# Patient Record
Sex: FEMALE | Race: WHITE | Employment: FULL TIME | URBAN - METROPOLITAN AREA
[De-identification: names, ages, dates, MRNs, and addresses within clinical notes are randomized per-mention and may not be internally consistent; named-entity substitution may affect disease eponyms.]

---

## 2017-02-23 PROBLEM — E11.9 TYPE 2 DIABETES MELLITUS WITHOUT COMPLICATION, WITHOUT LONG-TERM CURRENT USE OF INSULIN (HCC): Status: ACTIVE | Noted: 2017-02-23

## 2017-02-23 PROBLEM — E78.2 MIXED HYPERLIPIDEMIA: Status: ACTIVE | Noted: 2017-02-23

## 2017-02-23 PROBLEM — E66.01 MORBID OBESITY DUE TO EXCESS CALORIES (HCC): Status: ACTIVE | Noted: 2017-02-23

## 2017-02-23 PROBLEM — Z79.4 TYPE 2 DIABETES MELLITUS WITHOUT COMPLICATION, WITH LONG-TERM CURRENT USE OF INSULIN (HCC): Status: ACTIVE | Noted: 2017-02-23

## 2017-10-27 PROBLEM — M65.4 DE QUERVAIN'S DISEASE (RADIAL STYLOID TENOSYNOVITIS): Status: ACTIVE | Noted: 2017-10-27

## 2017-11-29 ENCOUNTER — CLINICAL DOCUMENTATION (OUTPATIENT)
Dept: PHARMACY | Facility: CLINIC | Age: 57
End: 2017-11-29

## 2017-11-29 NOTE — PROGRESS NOTES
Pharmacy Pop Care Documentation:   Patient application received. Patient missing Lipid panel drawn yearly and MyChart account creation: code sent via email provided. Letter sent.

## 2017-11-29 NOTE — LETTER
Lex Pereira [address]           11/29/17     Dear Gisella Green,    Thanks so much for taking the first step towards better health. This letter is to inform you that we have received your enrollment form for the [facility] DM Program but you are missing the following requirements or documentation:    Lipid panel drawn yearly and MyChart account creation    To continue to qualify for this program the above requirements must be met by 12/15/17. Results or visits obtained outside of [facility] will need to be provided by fax or email to the numbers listed below before the deadline in order to qualify for the program.    If requirements are not met by the date listed above you will be disqualified from the program and the credit valued at $600 towards your diabetic medications and supplies will be revoked. You will be able to reapply the following calendar year. [facility] Team        [phone] Option #7    Email: [email or web address]    Fax Number: [phone]

## 2018-01-30 ENCOUNTER — TELEPHONE (OUTPATIENT)
Dept: PHARMACY | Facility: CLINIC | Age: 58
End: 2018-01-30

## 2018-01-30 NOTE — TELEPHONE ENCOUNTER
Called patient to schedule pharmacist appointment to discuss medications for Diabetes Management Program and for updated billing information for [address] [place] [address]. No answer. Left VM: Please call back at [phone] Option #7 to retrieve the above message.      Sreekanth [place], [address]   Department, toll free: [phone], option 7

## 2018-03-01 ENCOUNTER — TELEPHONE (OUTPATIENT)
Dept: PHARMACY | Facility: CLINIC | Age: 58
End: 2018-03-01

## 2018-03-01 NOTE — LETTER
Mark Anthony Ernandez Findlynda [address]           03/01/18     Dear Debra Harrison,    Thanks so much for taking the first step towards better health. To continue to qualify for this program one of the requirements that must be met by 6/01/18 is the following:    ? Meet with a [facility]) clinical pharmacist in person at a hospital location or by phone (This visit may be conducted prior to the start of the requirements period.)    This letter is to inform you that we have been attempting to contact you to schedule this appointment via telephone but we were unsuccessful. Please contact us at [phone] Option #7 to schedule this appointment. If requirements are not met by the date listed above you will be disqualified from the program and the credit valued at $600 towards your diabetic medications and supplies will be revoked. You will be able to reapply the following calendar year. [facility] Team    [phone] Option #7    Email: [email or web address]

## 2018-03-19 ENCOUNTER — TELEPHONE (OUTPATIENT)
Dept: NON INVASIVE DIAGNOSTICS | Age: 58
End: 2018-03-19

## 2018-03-19 ENCOUNTER — OFFICE VISIT (OUTPATIENT)
Dept: CARDIOLOGY CLINIC | Age: 58
End: 2018-03-19
Payer: COMMERCIAL

## 2018-03-19 VITALS
BODY MASS INDEX: 41.82 KG/M2 | HEIGHT: 65 IN | HEART RATE: 64 BPM | DIASTOLIC BLOOD PRESSURE: 78 MMHG | SYSTOLIC BLOOD PRESSURE: 130 MMHG | WEIGHT: 251 LBS

## 2018-03-19 DIAGNOSIS — E66.01 MORBID OBESITY DUE TO EXCESS CALORIES (HCC): ICD-10-CM

## 2018-03-19 DIAGNOSIS — I47.29 RVOT VENTRICULAR TACHYCARDIA: ICD-10-CM

## 2018-03-19 DIAGNOSIS — I47.1 PAROXYSMAL SUPRAVENTRICULAR TACHYCARDIA (HCC): Primary | ICD-10-CM

## 2018-03-19 DIAGNOSIS — I47.1 AVNRT (AV NODAL RE-ENTRY TACHYCARDIA) (HCC): ICD-10-CM

## 2018-03-19 DIAGNOSIS — E11.9 TYPE 2 DIABETES MELLITUS WITHOUT COMPLICATION, WITH LONG-TERM CURRENT USE OF INSULIN (HCC): ICD-10-CM

## 2018-03-19 DIAGNOSIS — Z79.4 TYPE 2 DIABETES MELLITUS WITHOUT COMPLICATION, WITH LONG-TERM CURRENT USE OF INSULIN (HCC): ICD-10-CM

## 2018-03-19 PROCEDURE — 99214 OFFICE O/P EST MOD 30 MIN: CPT | Performed by: INTERNAL MEDICINE

## 2018-03-19 PROCEDURE — 93000 ELECTROCARDIOGRAM COMPLETE: CPT | Performed by: INTERNAL MEDICINE

## 2018-03-19 RX ORDER — FLECAINIDE ACETATE 100 MG/1
100 TABLET ORAL 2 TIMES DAILY
Qty: 180 TABLET | Refills: 3 | Status: SHIPPED | OUTPATIENT
Start: 2018-03-19 | End: 2019-03-29 | Stop reason: SDUPTHER

## 2018-03-19 RX ORDER — ATENOLOL 25 MG/1
TABLET ORAL
Qty: 180 TABLET | Refills: 3 | Status: SHIPPED | OUTPATIENT
Start: 2018-03-19 | End: 2019-03-29 | Stop reason: SDUPTHER

## 2018-03-20 ENCOUNTER — PATIENT MESSAGE (OUTPATIENT)
Dept: PHARMACY | Facility: CLINIC | Age: 58
End: 2018-03-20

## 2018-03-26 ENCOUNTER — HOSPITAL ENCOUNTER (OUTPATIENT)
Age: 58
Discharge: HOME | End: 2018-03-28
Payer: COMMERCIAL

## 2018-03-26 PROCEDURE — 88175 CYTOPATH C/V AUTO FLUID REDO: CPT

## 2018-04-02 ENCOUNTER — TELEPHONE (OUTPATIENT)
Dept: NON INVASIVE DIAGNOSTICS | Age: 58
End: 2018-04-02

## 2018-04-06 ENCOUNTER — TELEPHONE (OUTPATIENT)
Dept: CARDIOLOGY CLINIC | Age: 58
End: 2018-04-06

## 2018-04-16 ENCOUNTER — TELEPHONE (OUTPATIENT)
Dept: PHARMACY | Facility: CLINIC | Age: 58
End: 2018-04-16

## 2018-06-06 ENCOUNTER — SCHEDULED TELEPHONE ENCOUNTER (OUTPATIENT)
Dept: PHARMACY | Facility: CLINIC | Age: 58
End: 2018-06-06

## 2018-06-06 RX ORDER — IBUPROFEN 200 MG
200 TABLET ORAL EVERY 6 HOURS PRN
COMMUNITY
End: 2019-08-22 | Stop reason: ALTCHOICE

## 2018-06-13 ENCOUNTER — HOSPITAL ENCOUNTER (OUTPATIENT)
Age: 58
Discharge: HOME | End: 2018-06-15
Payer: COMMERCIAL

## 2018-06-13 DIAGNOSIS — E11.42 TYPE 2 DIABETES MELLITUS WITH DIABETIC POLYNEUROPATHY, WITH LONG-TERM CURRENT USE OF INSULIN (HCC): ICD-10-CM

## 2018-06-13 DIAGNOSIS — Z79.4 TYPE 2 DIABETES MELLITUS WITH DIABETIC POLYNEUROPATHY, WITH LONG-TERM CURRENT USE OF INSULIN (HCC): ICD-10-CM

## 2018-06-13 DIAGNOSIS — Z11.4 SCREENING FOR HIV (HUMAN IMMUNODEFICIENCY VIRUS): ICD-10-CM

## 2018-06-13 LAB
ALBUMIN SERPL-MCNC: 4.4 G/DL (ref 3.5–5.2)
ALP BLD-CCNC: 69 U/L (ref 35–104)
ALT SERPL-CCNC: 21 U/L (ref 0–32)
ANION GAP SERPL CALCULATED.3IONS-SCNC: 20 MMOL/L (ref 7–16)
AST SERPL-CCNC: 18 U/L (ref 0–31)
BILIRUB SERPL-MCNC: 0.4 MG/DL (ref 0–1.2)
BUN BLDV-MCNC: 17 MG/DL (ref 6–20)
CALCIUM SERPL-MCNC: 9.8 MG/DL (ref 8.6–10.2)
CHLORIDE BLD-SCNC: 99 MMOL/L (ref 98–107)
CO2: 24 MMOL/L (ref 22–29)
CREAT SERPL-MCNC: 0.7 MG/DL (ref 0.5–1)
FOLATE: 12.3 NG/ML (ref 4.8–24.2)
GFR AFRICAN AMERICAN: >60
GFR NON-AFRICAN AMERICAN: >60 ML/MIN/1.73
GLUCOSE BLD-MCNC: 129 MG/DL (ref 74–109)
HBA1C MFR BLD: 6.8 % (ref 4.8–5.9)
POTASSIUM SERPL-SCNC: 4.3 MMOL/L (ref 3.5–5)
SODIUM BLD-SCNC: 143 MMOL/L (ref 132–146)
TOTAL PROTEIN: 7.7 G/DL (ref 6.4–8.3)
VITAMIN B-12: 277 PG/ML (ref 211–946)
VITAMIN D 25-HYDROXY: 11 NG/ML (ref 30–100)

## 2018-06-13 PROCEDURE — 83036 HEMOGLOBIN GLYCOSYLATED A1C: CPT

## 2018-06-13 PROCEDURE — 82306 VITAMIN D 25 HYDROXY: CPT

## 2018-06-13 PROCEDURE — 82607 VITAMIN B-12: CPT

## 2018-06-13 PROCEDURE — 82746 ASSAY OF FOLIC ACID SERUM: CPT

## 2018-06-13 PROCEDURE — 80053 COMPREHEN METABOLIC PANEL: CPT

## 2018-06-13 PROCEDURE — 86703 HIV-1/HIV-2 1 RESULT ANTBDY: CPT

## 2018-06-14 DIAGNOSIS — E55.9 VITAMIN D DEFICIENCY: Primary | ICD-10-CM

## 2018-06-14 LAB — HIV-1 AND HIV-2 ANTIBODIES: NORMAL

## 2018-06-14 RX ORDER — ERGOCALCIFEROL 1.25 MG/1
50000 CAPSULE ORAL WEEKLY
Qty: 12 CAPSULE | Refills: 2 | Status: SHIPPED | OUTPATIENT
Start: 2018-06-14 | End: 2019-03-04 | Stop reason: SDUPTHER

## 2018-06-24 ENCOUNTER — HOSPITAL ENCOUNTER (OUTPATIENT)
Age: 58
Discharge: HOME | End: 2018-06-24
Payer: COMMERCIAL

## 2018-06-24 LAB
BASOPHILS ABSOLUTE: 0.04 E9/L (ref 0–0.2)
BASOPHILS RELATIVE PERCENT: 0.6 % (ref 0–2)
EOSINOPHILS ABSOLUTE: 0.33 E9/L (ref 0.05–0.5)
EOSINOPHILS RELATIVE PERCENT: 4.7 % (ref 0–6)
HCT VFR BLD CALC: 39.2 % (ref 34–48)
HEMOGLOBIN: 12.6 G/DL (ref 11.5–15.5)
IMMATURE GRANULOCYTES #: 0.03 E9/L
IMMATURE GRANULOCYTES %: 0.4 % (ref 0–5)
LYMPHOCYTES ABSOLUTE: 1.68 E9/L (ref 1.5–4)
LYMPHOCYTES RELATIVE PERCENT: 23.9 % (ref 20–42)
MCH RBC QN AUTO: 27 PG (ref 26–35)
MCHC RBC AUTO-ENTMCNC: 32.1 % (ref 32–34.5)
MCV RBC AUTO: 83.9 FL (ref 80–99.9)
MONOCYTES ABSOLUTE: 0.41 E9/L (ref 0.1–0.95)
MONOCYTES RELATIVE PERCENT: 5.8 % (ref 2–12)
NEUTROPHILS ABSOLUTE: 4.54 E9/L (ref 1.8–7.3)
NEUTROPHILS RELATIVE PERCENT: 64.6 % (ref 43–80)
PDW BLD-RTO: 14.7 FL (ref 11.5–15)
PLATELET # BLD: 181 E9/L (ref 130–450)
PMV BLD AUTO: 10.2 FL (ref 7–12)
RBC # BLD: 4.67 E12/L (ref 3.5–5.5)
WBC # BLD: 7 E9/L (ref 4.5–11.5)

## 2018-06-24 PROCEDURE — 36415 COLL VENOUS BLD VENIPUNCTURE: CPT

## 2018-06-24 PROCEDURE — 85025 COMPLETE CBC W/AUTO DIFF WBC: CPT

## 2018-06-24 PROCEDURE — 86300 IMMUNOASSAY TUMOR CA 15-3: CPT

## 2018-06-26 LAB — CA 27.29: 20.8 U/ML (ref 0–40)

## 2018-08-31 ENCOUNTER — OFFICE VISIT (OUTPATIENT)
Dept: PHYSICAL MEDICINE AND REHAB | Age: 58
End: 2018-08-31
Payer: COMMERCIAL

## 2018-08-31 VITALS
OXYGEN SATURATION: 97 % | SYSTOLIC BLOOD PRESSURE: 104 MMHG | BODY MASS INDEX: 39.32 KG/M2 | DIASTOLIC BLOOD PRESSURE: 78 MMHG | HEIGHT: 65 IN | WEIGHT: 236 LBS | HEART RATE: 83 BPM

## 2018-08-31 DIAGNOSIS — M65.4 DE QUERVAIN'S DISEASE (RADIAL STYLOID TENOSYNOVITIS): ICD-10-CM

## 2018-08-31 DIAGNOSIS — M65.4 DE QUERVAIN'S DISEASE (RADIAL STYLOID TENOSYNOVITIS): Primary | ICD-10-CM

## 2018-08-31 DIAGNOSIS — M25.532 LEFT WRIST PAIN: ICD-10-CM

## 2018-08-31 PROCEDURE — 76942 ECHO GUIDE FOR BIOPSY: CPT | Performed by: PHYSICAL MEDICINE & REHABILITATION

## 2018-08-31 PROCEDURE — 20550 NJX 1 TENDON SHEATH/LIGAMENT: CPT | Performed by: PHYSICAL MEDICINE & REHABILITATION

## 2018-08-31 PROCEDURE — 99213 OFFICE O/P EST LOW 20 MIN: CPT | Performed by: PHYSICAL MEDICINE & REHABILITATION

## 2018-08-31 RX ORDER — TRIAMCINOLONE ACETONIDE 40 MG/ML
40 INJECTION, SUSPENSION INTRA-ARTICULAR; INTRAMUSCULAR ONCE
Status: COMPLETED | OUTPATIENT
Start: 2018-08-31 | End: 2018-08-31

## 2018-08-31 RX ORDER — BUPIVACAINE HYDROCHLORIDE 2.5 MG/ML
2 INJECTION, SOLUTION INFILTRATION; PERINEURAL ONCE
Status: COMPLETED | OUTPATIENT
Start: 2018-08-31 | End: 2018-08-31

## 2018-08-31 RX ADMIN — TRIAMCINOLONE ACETONIDE 40 MG: 40 INJECTION, SUSPENSION INTRA-ARTICULAR; INTRAMUSCULAR at 11:16

## 2018-08-31 RX ADMIN — BUPIVACAINE HYDROCHLORIDE 5 MG: 2.5 INJECTION, SOLUTION INFILTRATION; PERINEURAL at 11:15

## 2018-08-31 NOTE — PATIENT INSTRUCTIONS
We appreciate that you chose [facility] to provide your healthcare needs today. We took great pleasure in caring for you. You may receive a survey to help us learn how to make your next visit to a [facility] facility better than the last.   Your feedback is important to help us continually improve the service we can provide you. Please take the time to complete it thoughtfully if you receive one as we value the feedback in every survey. In this survey we would appreciate that you answer \"always\" or \"yes\" for as many questions as possible (this is the answer we get credit for doing a good job). If you feel there is a question you cannot answer \"always\" or \"yes\", please let us know before you leave today so we can remedy the situation right away. We look forward to continuing to provide you with excellent care. Considering the survey questions related to access to care, please keep in mind the urgency of your problem (i.e. was it an emergent or urgent visit or more routine)  and whether the urgency of that problem was handled appropriately by our office. We always do our best to prioritize the patients who need the care most urgently given our specialty is in short supply and there is a high demand for visits. Thank you for your time and consideration.

## 2018-08-31 NOTE — PROGRESS NOTES
Don Gage D.O. [facility]  [address] [facility]  Phone: [phone]  Fax: [phone]      8/31/18    Chief Complaint   Patient presents with    Hand Pain     Follow Up Left Hand Pain     Wrist Pain       HPI:  Riley Saldivar is a 60y.o. year old woman seen today in follow up regarding wrist pain. Interval history: Since the last visit the patient had 100% relief of pain since the first dorsal compartment injection in October 2017 until about 3 weeks ago when the pain flared back up. Today, the pain is rated Pain Score:   7 where 0 is no pain and 10 is pain as bad as it can be. The pain is located in the left wrist,  does not radiate and is described as aching. This pain occurs intermittently. The symptoms have been better since onset. Symptoms are exacerbated by cutting with knives. Factors which relieve the pain include injection, therapy. Other associated symptoms include none. Otherwise, the pain assessment has not changed since the last visit. Past Medical History:   Diagnosis Date    A-fib [facility])     Atrial fibrillation ([facility]     Cancer ([facility] 2006    left breast    Encounter for wound care     stomach  and then    left breast    Heartburn     Lymphedema 2007    Left arm following breast cancer    Mixed hyperlipidemia 2/23/2017    Type 2 diabetes mellitus without complication [facility])        Past Surgical History:   Procedure Laterality Date    ABLATION OF DYSRHYTHMIC FOCUS      tachycardia  had ablation x 4, most recent 06/2006    BREAST SURGERY  2007    left breast ca--bilateral mastectomy    COLONOSCOPY      DIAGNOSTIC CARDIAC CATH LAB PROCEDURE  3/28/06    TMH: Left main: Patent vessel with no significant disease. LAD: Less than 20% ostial stenosis. Rest of LAD no significant disease, long, wrapped around apex. LCX: Patent with no disease. RCA: Patent with no significant disease.  Normal LVF, EF more than

## 2018-11-27 ENCOUNTER — TELEPHONE (OUTPATIENT)
Dept: PHARMACY | Facility: CLINIC | Age: 58
End: 2018-11-27

## 2018-12-10 ENCOUNTER — HOSPITAL ENCOUNTER (OUTPATIENT)
Age: 58
Discharge: HOME | End: 2018-12-12
Payer: COMMERCIAL

## 2018-12-10 DIAGNOSIS — E66.01 MORBID OBESITY DUE TO EXCESS CALORIES (HCC): ICD-10-CM

## 2018-12-10 DIAGNOSIS — E11.9 TYPE 2 DIABETES MELLITUS WITHOUT COMPLICATION, WITH LONG-TERM CURRENT USE OF INSULIN (HCC): ICD-10-CM

## 2018-12-10 DIAGNOSIS — Z79.4 TYPE 2 DIABETES MELLITUS WITHOUT COMPLICATION, WITH LONG-TERM CURRENT USE OF INSULIN (HCC): ICD-10-CM

## 2018-12-10 LAB
ALBUMIN SERPL-MCNC: 4.5 G/DL (ref 3.5–5.2)
ALP BLD-CCNC: 60 U/L (ref 35–104)
ALT SERPL-CCNC: 20 U/L (ref 0–32)
ANION GAP SERPL CALCULATED.3IONS-SCNC: 15 MMOL/L (ref 7–16)
AST SERPL-CCNC: 20 U/L (ref 0–31)
BACTERIA: ABNORMAL /HPF
BASOPHILS ABSOLUTE: 0.05 E9/L (ref 0–0.2)
BASOPHILS RELATIVE PERCENT: 0.8 % (ref 0–2)
BILIRUB SERPL-MCNC: 0.4 MG/DL (ref 0–1.2)
BUN BLDV-MCNC: 13 MG/DL (ref 6–20)
CALCIUM SERPL-MCNC: 9.9 MG/DL (ref 8.6–10.2)
CHLORIDE BLD-SCNC: 100 MMOL/L (ref 98–107)
CHOLESTEROL, TOTAL: 163 MG/DL (ref 0–199)
CO2: 26 MMOL/L (ref 22–29)
CREAT SERPL-MCNC: 0.7 MG/DL (ref 0.5–1)
EOSINOPHILS ABSOLUTE: 0.25 E9/L (ref 0.05–0.5)
EOSINOPHILS RELATIVE PERCENT: 3.9 % (ref 0–6)
GFR AFRICAN AMERICAN: >60
GFR NON-AFRICAN AMERICAN: >60 ML/MIN/1.73
GLUCOSE BLD-MCNC: 102 MG/DL (ref 74–99)
HBA1C MFR BLD: 5.7 % (ref 4–5.6)
HCT VFR BLD CALC: 40 % (ref 34–48)
HDLC SERPL-MCNC: 41 MG/DL
HEMOGLOBIN: 12.2 G/DL (ref 11.5–15.5)
IMMATURE GRANULOCYTES #: 0.02 E9/L
IMMATURE GRANULOCYTES %: 0.3 % (ref 0–5)
LDL CHOLESTEROL CALCULATED: 89 MG/DL (ref 0–99)
LYMPHOCYTES ABSOLUTE: 1.22 E9/L (ref 1.5–4)
LYMPHOCYTES RELATIVE PERCENT: 19.3 % (ref 20–42)
MCH RBC QN AUTO: 26.8 PG (ref 26–35)
MCHC RBC AUTO-ENTMCNC: 30.5 % (ref 32–34.5)
MCV RBC AUTO: 87.7 FL (ref 80–99.9)
MICROALBUMIN UR-MCNC: <12 MG/L
MONOCYTES ABSOLUTE: 0.41 E9/L (ref 0.1–0.95)
MONOCYTES RELATIVE PERCENT: 6.5 % (ref 2–12)
NEUTROPHILS ABSOLUTE: 4.38 E9/L (ref 1.8–7.3)
NEUTROPHILS RELATIVE PERCENT: 69.2 % (ref 43–80)
PDW BLD-RTO: 14.6 FL (ref 11.5–15)
PLATELET # BLD: 208 E9/L (ref 130–450)
PMV BLD AUTO: 11.1 FL (ref 7–12)
POTASSIUM SERPL-SCNC: 4.4 MMOL/L (ref 3.5–5)
RBC # BLD: 4.56 E12/L (ref 3.5–5.5)
RBC UA: ABNORMAL /HPF (ref 0–2)
SODIUM BLD-SCNC: 141 MMOL/L (ref 132–146)
TOTAL PROTEIN: 7.5 G/DL (ref 6.4–8.3)
TRIGL SERPL-MCNC: 164 MG/DL (ref 0–149)
TSH SERPL DL<=0.05 MIU/L-ACNC: 0.98 UIU/ML (ref 0.27–4.2)
VLDLC SERPL CALC-MCNC: 33 MG/DL
WBC # BLD: 6.3 E9/L (ref 4.5–11.5)
WBC UA: ABNORMAL /HPF (ref 0–5)

## 2018-12-10 PROCEDURE — 80061 LIPID PANEL: CPT

## 2018-12-10 PROCEDURE — 80053 COMPREHEN METABOLIC PANEL: CPT

## 2018-12-10 PROCEDURE — 83036 HEMOGLOBIN GLYCOSYLATED A1C: CPT

## 2018-12-10 PROCEDURE — 85025 COMPLETE CBC W/AUTO DIFF WBC: CPT

## 2018-12-10 PROCEDURE — 82044 UR ALBUMIN SEMIQUANTITATIVE: CPT

## 2018-12-10 PROCEDURE — 84443 ASSAY THYROID STIM HORMONE: CPT

## 2018-12-10 PROCEDURE — 81015 MICROSCOPIC EXAM OF URINE: CPT

## 2018-12-17 ENCOUNTER — HOSPITAL ENCOUNTER (OUTPATIENT)
Dept: CT IMAGING | Age: 58
Discharge: HOME | End: 2018-12-17
Payer: COMMERCIAL

## 2018-12-17 DIAGNOSIS — J34.9 SINUS PROBLEM: ICD-10-CM

## 2018-12-17 PROCEDURE — 70486 CT MAXILLOFACIAL W/O DYE: CPT

## 2019-01-09 ENCOUNTER — PATIENT MESSAGE (OUTPATIENT)
Dept: PHARMACY | Facility: CLINIC | Age: 59
End: 2019-01-09

## 2019-02-21 ENCOUNTER — PATIENT MESSAGE (OUTPATIENT)
Dept: PHARMACY | Facility: CLINIC | Age: 59
End: 2019-02-21

## 2019-03-29 ENCOUNTER — HOSPITAL ENCOUNTER (OUTPATIENT)
Age: 59
Discharge: HOME | End: 2019-03-31
Payer: COMMERCIAL

## 2019-03-29 ENCOUNTER — OFFICE VISIT (OUTPATIENT)
Dept: CARDIOLOGY CLINIC | Age: 59
End: 2019-03-29
Payer: COMMERCIAL

## 2019-03-29 ENCOUNTER — TELEPHONE (OUTPATIENT)
Dept: NON INVASIVE DIAGNOSTICS | Age: 59
End: 2019-03-29

## 2019-03-29 VITALS
HEIGHT: 65 IN | SYSTOLIC BLOOD PRESSURE: 110 MMHG | WEIGHT: 233 LBS | BODY MASS INDEX: 38.82 KG/M2 | HEART RATE: 72 BPM | DIASTOLIC BLOOD PRESSURE: 60 MMHG

## 2019-03-29 DIAGNOSIS — I47.29 RVOT-VT (RIGHT VENTRICULAR OUTFLOW TRACT VENTRICULAR TACHYCARDIA): ICD-10-CM

## 2019-03-29 DIAGNOSIS — E11.9 TYPE 2 DIABETES MELLITUS WITHOUT COMPLICATION, WITH LONG-TERM CURRENT USE OF INSULIN (HCC): ICD-10-CM

## 2019-03-29 DIAGNOSIS — I47.1 AVNRT (AV NODAL RE-ENTRY TACHYCARDIA) (HCC): Primary | ICD-10-CM

## 2019-03-29 DIAGNOSIS — I47.1 PSVT (PAROXYSMAL SUPRAVENTRICULAR TACHYCARDIA) (HCC): ICD-10-CM

## 2019-03-29 DIAGNOSIS — E66.01 MORBID OBESITY DUE TO EXCESS CALORIES (HCC): ICD-10-CM

## 2019-03-29 DIAGNOSIS — Z79.4 TYPE 2 DIABETES MELLITUS WITHOUT COMPLICATION, WITH LONG-TERM CURRENT USE OF INSULIN (HCC): ICD-10-CM

## 2019-03-29 PROCEDURE — G0123 SCREEN CERV/VAG THIN LAYER: HCPCS

## 2019-03-29 PROCEDURE — 93000 ELECTROCARDIOGRAM COMPLETE: CPT | Performed by: INTERNAL MEDICINE

## 2019-03-29 PROCEDURE — 99214 OFFICE O/P EST MOD 30 MIN: CPT | Performed by: INTERNAL MEDICINE

## 2019-03-29 RX ORDER — ENALAPRIL MALEATE 2.5 MG/1
2.5 TABLET ORAL NIGHTLY
Qty: 90 TABLET | Refills: 3 | Status: SHIPPED | OUTPATIENT
Start: 2019-03-29 | End: 2019-04-29 | Stop reason: SDUPTHER

## 2019-03-29 RX ORDER — FLECAINIDE ACETATE 100 MG/1
100 TABLET ORAL 2 TIMES DAILY
Qty: 180 TABLET | Refills: 3 | Status: SHIPPED
Start: 2019-03-29 | End: 2020-03-20 | Stop reason: SDUPTHER

## 2019-03-29 RX ORDER — ATENOLOL 25 MG/1
TABLET ORAL
Qty: 180 TABLET | Refills: 3 | Status: SHIPPED
Start: 2019-03-29 | End: 2020-03-20 | Stop reason: SDUPTHER

## 2019-04-02 ENCOUNTER — TELEPHONE (OUTPATIENT)
Dept: PHARMACY | Facility: CLINIC | Age: 59
End: 2019-04-02

## 2019-04-02 NOTE — LETTER
Robina Fuchs [address]           04/02/19     Dear Aleisha Lr,    Thank you for participating in Be Well With Diabetes. As a reminder, completing an A1C twice yearly is a requirement of the program.  We want to help you check it off your \"To Do\" list as part of your Be Well Within screening.     Here are the easy next steps. 1. Present this letter to your screener, and they will add an A1C screening to your labs.  The results will flow into your [employer] account so your provider can review them. 2. We would encourage you to follow-up a few weeks after your screening with your provider to discuss your results. This follow-up also satisfies one of your provider visit requirements needed to meet program eligibility.  It is always important to maintain a close relationship with your provider to better manage your condition. Remember to present this letter at your onsite screening. Without this documentation, the screener cannot add A1C labs to your screening.     If you have any questions regarding the program, please call [phone] and press the option for [facility] then Diabetes Management or email [email or web address].      Naresh Renee,   Grace Cuevas , PharmD [facility]/Ambulatory Care Clinical Pharmacy

## 2019-04-02 NOTE — LETTER
Renea JEWELL [address] [address]           04/02/19     Dear Corey Alberto! You have completed the 2018 requirements for the [address] will automatically be re-enrolled into the [facility]) Diabetes Management Program for 2019. One of the requirements to participate in the [facility] Diabetes Management Program is to complete a Clinical Pharmacist Telephone appointment yearly.       We would like to work with you and your doctor to:   - Review your medications, including over-the-counter and herbal medications   - Answer questions about your medications and how to get the most benefit from them   - Identify potential drug interactions or side effects and help fix them   - Identify preferred medications that are equally effective, but available at a lower cost to you   - Help you reach the necessary requirements to remain enrolled in the Diabetes Management Program offered by [facility]       Please call [phone] and select option #7 to schedule this appointment to take advantage of this service. Telephone appointments are available Monday thru Friday from 7:30 AM till 5:30 PM.       This is a courtesy reminder. If you have this appointment already scheduled for your 2019 enrollment in the program, please disregard this message.  If you have not scheduled this appointment yet, please contact us at the above number to schedule.       Sincerely,   [address]   Phone: [phone], option 7

## 2019-04-08 ENCOUNTER — PATIENT MESSAGE (OUTPATIENT)
Dept: PHARMACY | Facility: CLINIC | Age: 59
End: 2019-04-08

## 2019-04-08 NOTE — LETTER
Alberto Melissa [address]           04/25/19     Dear Kemar Maldonado,    Thanks so much for taking the first step towards better health. According to our records, you are missing the following requirement that must be completed by July 1st, 2019:       First A1C in 2019       You will have to submit documentation of completion of requirements if your Physician does not use the [address] [facility] charting system or if you have your lab/urine tests done outside of [address]. Return the documentation to [email or web address] or by fax at [phone]     This is a courtesy reminder. If you have your appointment(s) or lab work scheduled prior to the July 1st dead-line please disregard the above information. Just a reminder of the requirements to be completed between July 1 2019 and Dec. 31 2019   Diabetes Visit with your physician in 2019 (Second yearly visit)   Second A1C in 2019   Flu vaccination (once yearly)   Take diabetes medication as prescribed as well as cholesterol (Statin) or high blood pressure (ACE/ARB) medications, if needed. 70% adherence is required of diabetes medications   Provide documentation if cholesterol and/or high blood pressure medications are not needed. Lipid panel (once yearly)   Urine albumin (once yearly)   Pneumonia Vaccination (once or as indicated by physician)     Requirements if A1C is greater than 8 percent:   Engage with a [facility]) diabetes educator at one of our hospital locations or an ambulatory care coordinator by phone. If requirements(s) are not met by the date listed above you will be disqualified from the program and the credit valued at $600 towards your diabetic medications and supplies will be revoked. You will be able to reapply the following calendar year. [address] [place] [address],[address] Team   [phone] Option #7   Email: [email or web address]   Fax Number: [phone]

## 2019-04-16 ENCOUNTER — SCHEDULED TELEPHONE ENCOUNTER (OUTPATIENT)
Dept: PHARMACY | Facility: CLINIC | Age: 59
End: 2019-04-16

## 2019-04-16 RX ORDER — LANSOPRAZOLE 30 MG/1
30 CAPSULE, DELAYED RELEASE ORAL DAILY PRN
COMMUNITY
End: 2019-06-24 | Stop reason: SDUPTHER

## 2019-04-25 ENCOUNTER — HOSPITAL ENCOUNTER (OUTPATIENT)
Age: 59
Discharge: HOME | End: 2019-04-27
Payer: COMMERCIAL

## 2019-04-25 DIAGNOSIS — E55.9 VITAMIN D DEFICIENCY: ICD-10-CM

## 2019-04-25 DIAGNOSIS — E11.9 TYPE 2 DIABETES MELLITUS WITHOUT COMPLICATION, WITH LONG-TERM CURRENT USE OF INSULIN (HCC): ICD-10-CM

## 2019-04-25 DIAGNOSIS — E78.2 MIXED HYPERLIPIDEMIA: ICD-10-CM

## 2019-04-25 DIAGNOSIS — G62.9 NEUROPATHY: ICD-10-CM

## 2019-04-25 DIAGNOSIS — E05.90 HYPERTHYROIDISM: ICD-10-CM

## 2019-04-25 DIAGNOSIS — Z79.4 TYPE 2 DIABETES MELLITUS WITHOUT COMPLICATION, WITH LONG-TERM CURRENT USE OF INSULIN (HCC): ICD-10-CM

## 2019-04-25 DIAGNOSIS — R53.83 FATIGUE, UNSPECIFIED TYPE: ICD-10-CM

## 2019-04-25 LAB
ALBUMIN SERPL-MCNC: 4.3 G/DL (ref 3.5–5.2)
ALP BLD-CCNC: 65 U/L (ref 35–104)
ALT SERPL-CCNC: 18 U/L (ref 0–32)
ANION GAP SERPL CALCULATED.3IONS-SCNC: 11 MMOL/L (ref 7–16)
AST SERPL-CCNC: 19 U/L (ref 0–31)
BASOPHILS ABSOLUTE: 0.04 E9/L (ref 0–0.2)
BASOPHILS RELATIVE PERCENT: 0.7 % (ref 0–2)
BILIRUB SERPL-MCNC: 0.4 MG/DL (ref 0–1.2)
BUN BLDV-MCNC: 15 MG/DL (ref 8–23)
CALCIUM SERPL-MCNC: 9.8 MG/DL (ref 8.6–10.2)
CHLORIDE BLD-SCNC: 100 MMOL/L (ref 98–107)
CHOLESTEROL, TOTAL: 166 MG/DL (ref 0–199)
CO2: 28 MMOL/L (ref 22–29)
CREAT SERPL-MCNC: 0.6 MG/DL (ref 0.5–1)
EOSINOPHILS ABSOLUTE: 0.29 E9/L (ref 0.05–0.5)
EOSINOPHILS RELATIVE PERCENT: 5.1 % (ref 0–6)
GFR AFRICAN AMERICAN: >60
GFR NON-AFRICAN AMERICAN: >60 ML/MIN/1.73
GLUCOSE BLD-MCNC: 107 MG/DL (ref 74–99)
HBA1C MFR BLD: 6.1 % (ref 4–5.6)
HCT VFR BLD CALC: 39.4 % (ref 34–48)
HDLC SERPL-MCNC: 44 MG/DL
HEMOGLOBIN: 12.1 G/DL (ref 11.5–15.5)
IMMATURE GRANULOCYTES #: 0.02 E9/L
IMMATURE GRANULOCYTES %: 0.4 % (ref 0–5)
LDL CHOLESTEROL CALCULATED: 91 MG/DL (ref 0–99)
LYMPHOCYTES ABSOLUTE: 1.33 E9/L (ref 1.5–4)
LYMPHOCYTES RELATIVE PERCENT: 23.3 % (ref 20–42)
MAGNESIUM: 1.7 MG/DL (ref 1.6–2.6)
MCH RBC QN AUTO: 26.6 PG (ref 26–35)
MCHC RBC AUTO-ENTMCNC: 30.7 % (ref 32–34.5)
MCV RBC AUTO: 86.6 FL (ref 80–99.9)
MONOCYTES ABSOLUTE: 0.33 E9/L (ref 0.1–0.95)
MONOCYTES RELATIVE PERCENT: 5.8 % (ref 2–12)
NEUTROPHILS ABSOLUTE: 3.69 E9/L (ref 1.8–7.3)
NEUTROPHILS RELATIVE PERCENT: 64.7 % (ref 43–80)
PDW BLD-RTO: 14.7 FL (ref 11.5–15)
PHOSPHORUS: 4 MG/DL (ref 2.5–4.5)
PLATELET # BLD: 187 E9/L (ref 130–450)
PMV BLD AUTO: 11.3 FL (ref 7–12)
POTASSIUM SERPL-SCNC: 4.3 MMOL/L (ref 3.5–5)
RBC # BLD: 4.55 E12/L (ref 3.5–5.5)
SODIUM BLD-SCNC: 139 MMOL/L (ref 132–146)
T4 FREE: 1.46 NG/DL (ref 0.93–1.7)
TOTAL PROTEIN: 7.5 G/DL (ref 6.4–8.3)
TRIGL SERPL-MCNC: 154 MG/DL (ref 0–149)
TSH SERPL DL<=0.05 MIU/L-ACNC: 1.1 UIU/ML (ref 0.27–4.2)
VITAMIN D 25-HYDROXY: 29 NG/ML (ref 30–100)
VLDLC SERPL CALC-MCNC: 31 MG/DL
WBC # BLD: 5.7 E9/L (ref 4.5–11.5)

## 2019-04-25 PROCEDURE — 82306 VITAMIN D 25 HYDROXY: CPT

## 2019-04-25 PROCEDURE — 83036 HEMOGLOBIN GLYCOSYLATED A1C: CPT

## 2019-04-25 PROCEDURE — 84443 ASSAY THYROID STIM HORMONE: CPT

## 2019-04-25 PROCEDURE — 80061 LIPID PANEL: CPT

## 2019-04-25 PROCEDURE — 84100 ASSAY OF PHOSPHORUS: CPT

## 2019-04-25 PROCEDURE — 84439 ASSAY OF FREE THYROXINE: CPT

## 2019-04-25 PROCEDURE — 80053 COMPREHEN METABOLIC PANEL: CPT

## 2019-04-25 PROCEDURE — 83735 ASSAY OF MAGNESIUM: CPT

## 2019-04-25 PROCEDURE — 85025 COMPLETE CBC W/AUTO DIFF WBC: CPT

## 2019-04-25 NOTE — TELEPHONE ENCOUNTER
MyChart message not read by patient. Pharmacist appointment completed on 4/16/19. Patient still missing 1st 2019 A1C. Letter mailed to patient.

## 2019-04-29 ENCOUNTER — HOSPITAL ENCOUNTER (OUTPATIENT)
Age: 59
Discharge: HOME | End: 2019-05-01
Payer: COMMERCIAL

## 2019-04-29 DIAGNOSIS — G62.9 NEUROPATHY: ICD-10-CM

## 2019-04-29 DIAGNOSIS — Z79.4 TYPE 2 DIABETES MELLITUS WITHOUT COMPLICATION, WITH LONG-TERM CURRENT USE OF INSULIN (HCC): ICD-10-CM

## 2019-04-29 DIAGNOSIS — R53.83 FATIGUE, UNSPECIFIED TYPE: ICD-10-CM

## 2019-04-29 DIAGNOSIS — E11.9 TYPE 2 DIABETES MELLITUS WITHOUT COMPLICATION, WITH LONG-TERM CURRENT USE OF INSULIN (HCC): ICD-10-CM

## 2019-04-29 LAB
BACTERIA: ABNORMAL /HPF
BILIRUBIN URINE: NEGATIVE
BLOOD, URINE: NEGATIVE
CLARITY: CLEAR
COLOR: YELLOW
CREATININE URINE: 125 MG/DL (ref 29–226)
EPITHELIAL CELLS, UA: ABNORMAL /HPF
GLUCOSE URINE: NEGATIVE MG/DL
KETONES, URINE: NEGATIVE MG/DL
LEUKOCYTE ESTERASE, URINE: ABNORMAL
MICROALBUMIN UR-MCNC: <12 MG/L
MICROALBUMIN/CREAT UR-RTO: ABNORMAL (ref 0–30)
NITRITE, URINE: NEGATIVE
PH UA: 5.5 (ref 5–9)
PROTEIN UA: NEGATIVE MG/DL
RBC UA: ABNORMAL /HPF (ref 0–2)
SPECIFIC GRAVITY UA: 1.02 (ref 1–1.03)
UROBILINOGEN, URINE: 0.2 E.U./DL
WBC UA: ABNORMAL /HPF (ref 0–5)

## 2019-04-29 PROCEDURE — 82570 ASSAY OF URINE CREATININE: CPT

## 2019-04-29 PROCEDURE — 81001 URINALYSIS AUTO W/SCOPE: CPT

## 2019-04-29 PROCEDURE — 82044 UR ALBUMIN SEMIQUANTITATIVE: CPT

## 2019-05-02 ENCOUNTER — TELEPHONE (OUTPATIENT)
Dept: ADMINISTRATIVE | Age: 59
End: 2019-05-02

## 2019-05-02 NOTE — TELEPHONE ENCOUNTER
Dr. Juan Velasco office called requesting patient to be seen sooner than her appointment on 07-02-19 for nasal sinus polyp. Patient is added to the waitlist. Please call patient for sooner appointment, thank you.

## 2019-06-03 NOTE — PROGRESS NOTES
[address] [facility] and [address] [facility] Electrophysiology  Consultation Report  PATIENT: Fwan Neumann  MEDICAL RECORD NUMBER: <[id number]>  DATE OF SERVICE:  6/6/2019  ATTENDING ELECTROPHYSIOLOGIST: Hayley Perez MD  REFERRING PHYSICIAN: Imani Turk MD and Caroline Ulrich DO  CHIEF COMPLAINT: AVNRT and RVOT VT    HPI: This is a 60 y.o. female with a history of   Patient Active Problem List   Diagnosis    Type 2 diabetes mellitus without complication, with long-term current use of insulin ([address]    Mixed hyperlipidemia    Morbid obesity due to excess calories ([address]    De Quervain's disease (radial styloid tenosynovitis)    Type 2 diabetes mellitus without complication ([address]    Atrial fibrillation ([address]   who presents to cardiac electrophysiology clinic for consultation of AVNRT and RVOT VT. Ms. Sabrina Gastelum follows with Dr. Trip Banda from cardiology point of view. Ms. Sabrina Gastelum has a history of AVNRT and is s/p ablation in 3/2006 with Dr. Hodan Echeverria. Also she has had history of RVOT VT s/p ablation in 6/2006 with Dr. Hodan Echeverria. She has an EP study done in 2009 at [facility] with no inducible VT. She is currently on Flecainide for rhythm control and presents today in sinus rhythm. Since being on the flecainide she reports a relief of symptoms and her last episode of palpitations occurred when she followed with Dr. Hodan Echeverria in 2012. She does report some fatigue which she attributes to her BB. Also she does report snoring at night but has not been tested for sleep apnea. The patient denies any chest pain, dyspnea, dizziness, syncope, orthopnea or paroxysmal nocturnal dyspnea. Discussed with her about continuing the current medication regimen, lowering the dose of Flecainide or stopping the medication and repeating a EP study. She wishes to lower the dose of Flecainide at this time.           Patient Active Problem List    Diagnosis Date Noted    Atrial fibrillation ([address]     Type 2 diabetes mellitus without complication ([facility]     De Quervain's disease (radial styloid tenosynovitis) 10/27/2017    Type 2 diabetes mellitus without complication, with long-term current use of insulin ([facility] 2017    Mixed hyperlipidemia 2017    Morbid obesity due to excess calories ([facility] 2017       Past Medical History:   Diagnosis Date    A-fib [facility])     Atrial fibrillation ([facility]     Cancer ([facility] 2006    left breast    Encounter for wound care     stomach  and then    left breast    Heartburn     Lymphedema 2007    Left arm following breast cancer    Mixed hyperlipidemia 2017    [facility]        Family History   Problem Relation Age of Onset    Heart Failure Mother          at 66    Heart Disease Father     Cancer Father         Pancreatic Cancer  at 76    Diabetes Sister         MRSA pneumonia    Other Brother         Valvular heart disease    No Known Problems Brother     Diabetes Brother        Social History     Tobacco Use    Smoking status: Former Smoker     Packs/day: 1.00     Types: Cigarettes     Start date: 1977     Last attempt to quit: 2006     Years since quittin.9    Smokeless tobacco: Never Used    Tobacco comment: quit    Substance Use Topics    Alcohol use: Yes     Frequency: 2-4 times a month     Drinks per session: 1 or 2     Comment: on occas       Current Outpatient Medications   Medication Sig Dispense Refill    gabapentin (NEURONTIN) 300 MG capsule Take 1 capsule by mouth 2 times daily for 180 days.  Intended supply: 90 days 180 capsule 1    metFORMIN (GLUCOPHAGE) 1000 MG tablet TAKE ONE TABLET BY MOUTH TWICE A DAY WITH MEALS 180 tablet 1    sertraline (ZOLOFT) 50 MG tablet Take 1 tablet by mouth daily 90 tablet 1    hydrochlorothiazide (HYDRODIURIL) 25 MG tablet Take 1 tablet by mouth daily 90 tablet 1    fluticasone (VERAMYST) 27.5 MCG/SPRAY nasal spray 1 spray by Nasal route 2 times daily 3 Bottle 1    enalapril (VASOTEC) 2.5 MG tablet Take 1 tablet by mouth nightly 90 tablet 1    lansoprazole (PREVACID) 30 MG delayed release capsule Take 30 mg by mouth daily as needed (indigestion)      atenolol (TENORMIN) 25 MG tablet 25 mg TWICE daily 180 tablet 3    flecainide (TAMBOCOR) 100 MG tablet Take 1 tablet by mouth 2 times daily 180 tablet 3    Fluticasone Furoate-Vilanterol (BREO ELLIPTA) 200-25 MCG/INH AEPB Inhale 1 puff into the lungs daily 3 each 1    simvastatin (ZOCOR) 40 MG tablet Take 1 tablet by mouth nightly 90 tablet 1    vitamin D (ERGOCALCIFEROL) 54524 units CAPS capsule Take 1 capsule by mouth once a week 12 capsule 2    insulin glargine (LANTUS SOLOSTAR) 100 UNIT/ML injection pen Inject 20 Units into the skin daily 5 pen 3    VICTOZA 18 MG/3ML SOPN SC injection INJECT 1.8 MG INTO THE SKIN DAILY 27 mL 3    pyridoxine (RA VITAMIN B-6) 50 MG tablet Take 2 tablets by mouth daily 90 tablet 3    ibuprofen (ADVIL;MOTRIN) 200 MG tablet Take 200 mg by mouth every 6 hours as needed for Pain or Fever      albuterol sulfate HFA (PROAIR HFA) 108 (90 Base) MCG/ACT inhaler Inhale 2 puffs into the lungs every 4 hours as needed for Wheezing or Shortness of Breath 2 Inhaler 3    Insulin Pen Needle (BD PEN NEEDLE FLOYD U/F) 32G X 4 MM MISC USE AS DIRECTED 100 each 5     No current facility-administered medications for this visit. No Known Allergies    ROS:   Constitutional: Negative for fever, activity change and appetite change. HENT: Negative for epistaxis. Eyes: Negative for diploplia, blurred vision. Respiratory: Negative for cough, chest tightness, shortness of breath and wheezing. Cardiovascular: pertinent positives in HPI  Gastrointestinal: Negative for abdominal pain and blood in stool.    All other review of systems are negative     PHYSICAL EXAM:   Vitals:    06/06/19 0741   BP: 124/82   Pulse: 76   Resp: 22   Weight: 231 lb (104.8 kg)   Height: 5' 5\" (1.651 m)      Constitutional: Well-developed, no acute distress  Eyes: conjunctivae normal, no xanthelasma   Ears, Nose, Throat: oral mucosa moist, no cyanosis   CV: no JVD. Regular rate and rhythm. Normal S1S2 and no S3. No murmurs, rubs, or gallops. PMI is nondisplaced  Lungs: clear to auscultation bilaterally, normal respiratory effort without used of accessory muscles  Abdomen: soft, non-tender, bowel sounds present, no masses or hepatomegaly   Musculoskeletal: no digital clubbing, no edema   Skin: warm, no rashes     I have personally reviewed the laboratory, cardiac diagnostic and radiographic testing as outlined below:    Data:    No results for input(s): WBC, HGB, HCT, PLT in the last 72 hours. No results for input(s): NA, K, CL, CO2, BUN, CREATININE, CALCIUM in the last 72 hours. Invalid input(s): GLU, MAGNESIUM   Lab Results   Component Value Date    MG 1.7 2019     No results for input(s): TSH in the last 72 hours. No results for input(s): INR in the last 72 hours. EK19: Sinus rhythm, rate: 75 bpm - Please see scan in Cardiology. Stress Test: 2016:        I have independently reviewed all of the ECGs and rhythm strips per above     Assessment/Plan: This is a 60 y.o. female with a history of   Patient Active Problem List   Diagnosis    Type 2 diabetes mellitus without complication, with long-term current use of insulin ([address]    Mixed hyperlipidemia    Morbid obesity due to excess calories ([address]    De Quervain's disease (radial styloid tenosynovitis)    Type 2 diabetes mellitus without complication ([address]    Atrial fibrillation ([address]    who presents with AVNRT. 1. AVNRT  - Hx of ablation in 3/2006 by Dr. Real Talamantes.  - Currently on Flecainide for rhythm control.  - Advised to decrease Flecainide to 100 mg in AM and 50 mg in PM for a few weeks, then decrease to 50mg bid. - If palpitations return patient can return to Flecainide 100mg bid    2.  RVOT ventricular tachycardia  - S/p ablation in 6/2006 by Dr. Jair Bryson. - EP study  2009 at [facility] with no inducible VT. 3. Diabetes mellitus  - Type 2.  - Managed by PCP. Lab Results   Component Value Date    LABA1C 6.1 (H) 04/25/2019     4. Obesity  - Encouraged weight loss. - Body mass index is 38.44 kg/m². 5. Hyperlipidemia  - On statin therapy. Lab Results   Component Value Date    CHOL 166 04/25/2019    CHOL 163 12/10/2018    CHOL 168 12/10/2017     Lab Results   Component Value Date    TRIG 154 (H) 04/25/2019    TRIG 164 (H) 12/10/2018    TRIG 160 (H) 12/10/2017     Lab Results   Component Value Date    HDL 44 04/25/2019    HDL 41 12/10/2018    HDL 46 12/10/2017     Lab Results   Component Value Date    LDLCALC 91 04/25/2019    LDLCALC 89 12/10/2018    LDLCALC 90 12/10/2017     Lab Results   Component Value Date    LABVLDL 31 04/25/2019    LABVLDL 33 12/10/2018    LABVLDL 32 12/10/2017       Recommendations:    1. Continue Atenolol 25 mg daily. 2. Decrease Flecainide to 100 mg in AM and 50 mg in PM for a few weeks, then decrease to 50 mg bid. If palpitations return patient can return to 100 mg bid. 3. Follow up in 1 year. Encouraged the patient to call the office for any questions or concerns. I have spent a total of 60 minutes with the patient and the family reviewing the above stated recommendations. And a total of >50% of that time involved face-to-face time providing counseling and or coordination of care with the other providers. Thank you for allowing me to participate in your patient's care. Please call me if there are any questions or concerns.       Deena Smith MD  Cardiac Electrophysiology  [facility]  [facility] [place]: [facility]  6/6/19   7:55 AM

## 2019-06-06 ENCOUNTER — OFFICE VISIT (OUTPATIENT)
Dept: NON INVASIVE DIAGNOSTICS | Age: 59
End: 2019-06-06
Payer: COMMERCIAL

## 2019-06-06 VITALS
RESPIRATION RATE: 22 BRPM | WEIGHT: 231 LBS | HEIGHT: 65 IN | SYSTOLIC BLOOD PRESSURE: 124 MMHG | HEART RATE: 76 BPM | BODY MASS INDEX: 38.49 KG/M2 | DIASTOLIC BLOOD PRESSURE: 82 MMHG

## 2019-06-06 DIAGNOSIS — I47.1 SVT (SUPRAVENTRICULAR TACHYCARDIA) (HCC): Primary | ICD-10-CM

## 2019-06-06 PROCEDURE — 99205 OFFICE O/P NEW HI 60 MIN: CPT | Performed by: INTERNAL MEDICINE

## 2019-06-06 PROCEDURE — 93000 ELECTROCARDIOGRAM COMPLETE: CPT | Performed by: INTERNAL MEDICINE

## 2019-07-02 ENCOUNTER — TELEPHONE (OUTPATIENT)
Dept: ENT CLINIC | Age: 59
End: 2019-07-02

## 2019-07-02 ENCOUNTER — OFFICE VISIT (OUTPATIENT)
Dept: ENT CLINIC | Age: 59
End: 2019-07-02
Payer: COMMERCIAL

## 2019-07-02 VITALS
BODY MASS INDEX: 39.07 KG/M2 | WEIGHT: 234.5 LBS | DIASTOLIC BLOOD PRESSURE: 69 MMHG | HEART RATE: 80 BPM | HEIGHT: 65 IN | SYSTOLIC BLOOD PRESSURE: 115 MMHG

## 2019-07-02 DIAGNOSIS — J30.9 ALLERGIC RHINITIS, UNSPECIFIED SEASONALITY, UNSPECIFIED TRIGGER: Primary | ICD-10-CM

## 2019-07-02 DIAGNOSIS — J34.1 MUCOCELE OF FRONTAL SINUS: ICD-10-CM

## 2019-07-02 DIAGNOSIS — R09.82 POST-NASAL DRIP: ICD-10-CM

## 2019-07-02 PROCEDURE — 99203 OFFICE O/P NEW LOW 30 MIN: CPT | Performed by: OTOLARYNGOLOGY

## 2019-07-02 RX ORDER — AZELASTINE 1 MG/ML
2 SPRAY, METERED NASAL 2 TIMES DAILY
Qty: 4 BOTTLE | Refills: 1 | Status: SHIPPED
Start: 2019-07-02 | End: 2020-06-19

## 2019-07-02 NOTE — PROGRESS NOTES
DEPARTMENT OF OTOLARYNGOLOGY   HISTORY AND PHYSICAL      PATIENT: George Curiel : 1960 (60 y.o.)   DATE: 2019  REFERRED BY: Chandra Severs Bisel, DO  [address]      HISTORY OBTAINED FROM:  patient    CHIEF COMPLAINT:  had concerns including Sinus Problem (breathing thru nose is difficult, coughing at night because of the drainage. polpys removed 3 times in the past - last time 10 years ago). HISTORY OF PRESENT ILLNESS:                                                                                        George Curiel is a(n) 60 y.o. female presents to the office today as a new patient for evaluation of her sinus issues. Patient has chronic nasal congestion with chronic nightly cough (productive). Patient has history of nasal polyps s/p FESS 3x, last time was 10 years ago, by Dr Darling Pineda. Patient is currently on Flonase daily. Patient does not do any nasal saline rinse. Patient was an ex-smoker, quit in . Patient has A-fib controlled with atenolol and flecainide. Patient also had history of breast Ca s/p double mastectomy and currently in remission. Patient had allergy testing over 10 years ago, which was mostly negative during that time. Her last scan was 6 months ago showed moderate left frontal sinus mucosal thickening with expansile polypoid lesion.      Past Medical History:   Diagnosis Date    A-fib [facility])     [facility] [address]     Cancer ([facility] [address] 2006    left breast    Encounter for wound care     stomach  and then    left breast    Heartburn     Lymphedema 2007    Left arm following breast cancer    Mixed hyperlipidemia 2017    Type 2 diabetes mellitus without complication [facility])         Past Surgical History:   Procedure Laterality Date    ABLATION OF DYSRHYTHMIC FOCUS      tachycardia  had ablation x 4, most recent 2006    BREAST SURGERY  2007    left breast ca--bilateral mastectomy    COLONOSCOPY      DIAGNOSTIC CARDIAC CATH LAB PROCEDURE 3/28/06    TMH: Left main: Patent vessel with no significant disease. LAD: Less than 20% ostial stenosis. Rest of LAD no significant disease, long, wrapped around apex. LCX: Patent with no disease. RCA: Patent with no significant disease. Normal LVF, EF more than 55%, no MR.    HERNIA REPAIR      HYSTERECTOMY  feb 2013    laparoscopic robotic assisted    MASTECTOMY Bilateral 2007         Current Outpatient Medications:     insulin glargine (LANTUS SOLOSTAR) 100 UNIT/ML injection pen, Inject 20 Units into the skin daily, Disp: 5 pen, Rfl: 3    lansoprazole (PREVACID) 30 MG delayed release capsule, TAKE ONE CAPSULE BY MOUTH ONE TIME DAILY, Disp: 90 capsule, Rfl: 0    gabapentin (NEURONTIN) 300 MG capsule, Take 1 capsule by mouth 2 times daily for 180 days.  Intended supply: 90 days, Disp: 180 capsule, Rfl: 1    metFORMIN (GLUCOPHAGE) 1000 MG tablet, TAKE ONE TABLET BY MOUTH TWICE A DAY WITH MEALS, Disp: 180 tablet, Rfl: 1    sertraline (ZOLOFT) 50 MG tablet, Take 1 tablet by mouth daily, Disp: 90 tablet, Rfl: 1    hydrochlorothiazide (HYDRODIURIL) 25 MG tablet, Take 1 tablet by mouth daily, Disp: 90 tablet, Rfl: 1    fluticasone (VERAMYST) 27.5 MCG/SPRAY nasal spray, 1 spray by Nasal route 2 times daily, Disp: 3 Bottle, Rfl: 1    enalapril (VASOTEC) 2.5 MG tablet, Take 1 tablet by mouth nightly, Disp: 90 tablet, Rfl: 1    atenolol (TENORMIN) 25 MG tablet, 25 mg TWICE daily, Disp: 180 tablet, Rfl: 3    flecainide (TAMBOCOR) 100 MG tablet, Take 1 tablet by mouth 2 times daily, Disp: 180 tablet, Rfl: 3    Fluticasone Furoate-Vilanterol (BREO ELLIPTA) 200-25 MCG/INH AEPB, Inhale 1 puff into the lungs daily, Disp: 3 each, Rfl: 1    simvastatin (ZOCOR) 40 MG tablet, Take 1 tablet by mouth nightly, Disp: 90 tablet, Rfl: 1    vitamin D (ERGOCALCIFEROL) 70049 units CAPS capsule, Take 1 capsule by mouth once a week, Disp: 12 capsule, Rfl: 2    VICTOZA 18 MG/3ML SOPN SC injection, INJECT 1.8 MG INTO THE SKIN

## 2019-07-09 ENCOUNTER — HOSPITAL ENCOUNTER (OUTPATIENT)
Dept: CT IMAGING | Age: 59
Discharge: HOME | End: 2019-07-09
Payer: COMMERCIAL

## 2019-07-09 DIAGNOSIS — J34.1 MUCOCELE OF FRONTAL SINUS: ICD-10-CM

## 2019-07-09 DIAGNOSIS — J30.9 ALLERGIC RHINITIS, UNSPECIFIED SEASONALITY, UNSPECIFIED TRIGGER: ICD-10-CM

## 2019-07-09 DIAGNOSIS — R09.82 POST-NASAL DRIP: ICD-10-CM

## 2019-07-09 PROCEDURE — 70486 CT MAXILLOFACIAL W/O DYE: CPT

## 2019-07-23 ENCOUNTER — TELEPHONE (OUTPATIENT)
Dept: PHYSICAL MEDICINE AND REHAB | Age: 59
End: 2019-07-23

## 2019-07-23 ENCOUNTER — OFFICE VISIT (OUTPATIENT)
Dept: PHYSICAL MEDICINE AND REHAB | Age: 59
End: 2019-07-23
Payer: COMMERCIAL

## 2019-07-23 ENCOUNTER — NURSE TRIAGE (OUTPATIENT)
Dept: OTHER | Facility: CLINIC | Age: 59
End: 2019-07-23

## 2019-07-23 ENCOUNTER — HOSPITAL ENCOUNTER (OUTPATIENT)
Age: 59
Discharge: HOME | End: 2019-07-25
Payer: COMMERCIAL

## 2019-07-23 VITALS
SYSTOLIC BLOOD PRESSURE: 124 MMHG | HEART RATE: 74 BPM | TEMPERATURE: 98.2 F | DIASTOLIC BLOOD PRESSURE: 76 MMHG | BODY MASS INDEX: 38.82 KG/M2 | HEIGHT: 65 IN | WEIGHT: 233 LBS

## 2019-07-23 DIAGNOSIS — M65.9 TENOSYNOVITIS OF LEFT WRIST: ICD-10-CM

## 2019-07-23 DIAGNOSIS — M25.532 LEFT WRIST PAIN: Primary | ICD-10-CM

## 2019-07-23 DIAGNOSIS — R22.2 SUPRACLAVICULAR FOSSA FULLNESS: ICD-10-CM

## 2019-07-23 DIAGNOSIS — M25.532 LEFT WRIST PAIN: ICD-10-CM

## 2019-07-23 LAB
BUN BLDV-MCNC: 11 MG/DL (ref 8–23)
C-REACTIVE PROTEIN: 0.5 MG/DL (ref 0–0.4)
CREAT SERPL-MCNC: 0.7 MG/DL (ref 0.5–1)
GFR AFRICAN AMERICAN: >60
GFR NON-AFRICAN AMERICAN: >60 ML/MIN/1.73
HCT VFR BLD CALC: 41.7 % (ref 34–48)
HEMOGLOBIN: 13 G/DL (ref 11.5–15.5)
MCH RBC QN AUTO: 27.4 PG (ref 26–35)
MCHC RBC AUTO-ENTMCNC: 31.2 % (ref 32–34.5)
MCV RBC AUTO: 88 FL (ref 80–99.9)
PDW BLD-RTO: 15 FL (ref 11.5–15)
PLATELET # BLD: 187 E9/L (ref 130–450)
PMV BLD AUTO: 11.1 FL (ref 7–12)
RBC # BLD: 4.74 E12/L (ref 3.5–5.5)
RHEUMATOID FACTOR: <10 IU/ML (ref 0–13)
SEDIMENTATION RATE, ERYTHROCYTE: 35 MM/HR (ref 0–20)
URIC ACID, SERUM: 7.9 MG/DL (ref 2.4–5.7)
WBC # BLD: 7.3 E9/L (ref 4.5–11.5)

## 2019-07-23 PROCEDURE — 82565 ASSAY OF CREATININE: CPT

## 2019-07-23 PROCEDURE — 86431 RHEUMATOID FACTOR QUANT: CPT

## 2019-07-23 PROCEDURE — 85027 COMPLETE CBC AUTOMATED: CPT

## 2019-07-23 PROCEDURE — 84520 ASSAY OF UREA NITROGEN: CPT

## 2019-07-23 PROCEDURE — 85651 RBC SED RATE NONAUTOMATED: CPT

## 2019-07-23 PROCEDURE — 99214 OFFICE O/P EST MOD 30 MIN: CPT | Performed by: PHYSICAL MEDICINE & REHABILITATION

## 2019-07-23 PROCEDURE — 86038 ANTINUCLEAR ANTIBODIES: CPT

## 2019-07-23 PROCEDURE — 36415 COLL VENOUS BLD VENIPUNCTURE: CPT

## 2019-07-23 PROCEDURE — 86140 C-REACTIVE PROTEIN: CPT

## 2019-07-23 PROCEDURE — 20606 DRAIN/INJ JOINT/BURSA W/US: CPT | Performed by: PHYSICAL MEDICINE & REHABILITATION

## 2019-07-23 PROCEDURE — 86200 CCP ANTIBODY: CPT

## 2019-07-23 PROCEDURE — 84550 ASSAY OF BLOOD/URIC ACID: CPT

## 2019-07-23 RX ORDER — LIDOCAINE HYDROCHLORIDE 10 MG/ML
2 INJECTION, SOLUTION INFILTRATION; PERINEURAL ONCE
Status: COMPLETED | OUTPATIENT
Start: 2019-07-23 | End: 2019-07-23

## 2019-07-23 RX ORDER — TRIAMCINOLONE ACETONIDE 40 MG/ML
40 INJECTION, SUSPENSION INTRA-ARTICULAR; INTRAMUSCULAR ONCE
Status: COMPLETED | OUTPATIENT
Start: 2019-07-23 | End: 2019-07-23

## 2019-07-23 RX ADMIN — TRIAMCINOLONE ACETONIDE 40 MG: 40 INJECTION, SUSPENSION INTRA-ARTICULAR; INTRAMUSCULAR at 10:46

## 2019-07-23 RX ADMIN — LIDOCAINE HYDROCHLORIDE 2 ML: 10 INJECTION, SOLUTION INFILTRATION; PERINEURAL at 10:46

## 2019-07-23 NOTE — TELEPHONE ENCOUNTER
----- Message from Farrah Pedraza DO sent at 7/23/2019  1:44 PM EDT -----  Test results are normal please notify patient.
- - -

## 2019-07-23 NOTE — PROGRESS NOTES
Valdemar Nguyen D.O. [facility] [place] [facility]  [address] [facility] [place]  Phone: [phone]  Fax: [phone]        7/23/19    Chief Complaint   Patient presents with    Wrist Pain     Follow up, left    Hand Pain    Arm Pain       HPI:  Ignacio Gonzalez is a 60y.o. year old woman seen today in follow up regarding wrist pain. Interval history: Since the last visit the patient states her wrist has become red, hot and swollen for the last 3 weeks. She has not had any fevers, chills or drainage. She also has noted swelling left greater than right supraclavicular fullness. Today, the pain is rated Pain Score:   7 where 0 is no pain and 10 is pain as bad as it can be. The pain is located in the left wrist,  does not radiate, and is described as sharp, shooting. This pain occurs all day. The symptoms have been worse since onset. Symptoms are exacerbated by working. Factors which relieve the pain include rest. Other associated symptoms include none. Otherwise, the pain assessment has not changed since the last visit. Past Medical History:   Diagnosis Date    A-fib [facility])     [facility] fibrillation ([facility] [address]     Cancer ([facility] [address] 2006    left breast    Encounter for wound care     stomach  and then    left breast    Heartburn     Lymphedema 2007    Left arm following breast cancer    Mixed hyperlipidemia 2/23/2017    Type 2 diabetes mellitus without complication [facility])        Past Surgical History:   Procedure Laterality Date    ABLATION OF DYSRHYTHMIC FOCUS      tachycardia  had ablation x 4, most recent 06/2006    BREAST SURGERY  2007    left breast ca--bilateral mastectomy    COLONOSCOPY      DIAGNOSTIC CARDIAC CATH LAB PROCEDURE  3/28/06    TMH: Left main: Patent vessel with no significant disease. LAD: Less than 20% ostial stenosis. Rest of LAD no significant disease, long, wrapped around apex. LCX: Patent with no disease. RCA: Patent with no significant disease.

## 2019-07-24 ENCOUNTER — TELEPHONE (OUTPATIENT)
Dept: PHYSICAL MEDICINE AND REHAB | Age: 59
End: 2019-07-24

## 2019-07-24 LAB — ANTI-NUCLEAR ANTIBODY (ANA): NEGATIVE

## 2019-07-24 RX ORDER — COLCHICINE 0.6 MG/1
TABLET ORAL
Qty: 11 TABLET | Refills: 2 | Status: SHIPPED | OUTPATIENT
Start: 2019-07-24

## 2019-07-24 NOTE — TELEPHONE ENCOUNTER
----- Message from Larry Pardo DO sent at 7/24/2019  2:30 PM EDT -----  Test results are normal please notify patient.

## 2019-07-25 ENCOUNTER — TELEPHONE (OUTPATIENT)
Dept: PHYSICAL MEDICINE AND REHAB | Age: 59
End: 2019-07-25

## 2019-07-26 LAB — CCP IGG ANTIBODIES: 4 UNITS (ref 0–19)

## 2019-07-30 ENCOUNTER — TELEPHONE (OUTPATIENT)
Dept: PHYSICAL MEDICINE AND REHAB | Age: 59
End: 2019-07-30

## 2019-08-01 ENCOUNTER — HOSPITAL ENCOUNTER (OUTPATIENT)
Age: 59
Discharge: HOME | End: 2019-08-03
Payer: COMMERCIAL

## 2019-08-01 LAB
ALBUMIN SERPL-MCNC: 4.6 G/DL (ref 3.5–5.2)
ALP BLD-CCNC: 63 U/L (ref 35–104)
ALT SERPL-CCNC: 26 U/L (ref 0–32)
ANION GAP SERPL CALCULATED.3IONS-SCNC: 16 MMOL/L (ref 7–16)
AST SERPL-CCNC: 21 U/L (ref 0–31)
BASOPHILS ABSOLUTE: 0.05 E9/L (ref 0–0.2)
BASOPHILS RELATIVE PERCENT: 0.8 % (ref 0–2)
BILIRUB SERPL-MCNC: 0.4 MG/DL (ref 0–1.2)
BUN BLDV-MCNC: 12 MG/DL (ref 8–23)
CALCIUM SERPL-MCNC: 9.8 MG/DL (ref 8.6–10.2)
CHLORIDE BLD-SCNC: 100 MMOL/L (ref 98–107)
CO2: 26 MMOL/L (ref 22–29)
CREAT SERPL-MCNC: 0.7 MG/DL (ref 0.5–1)
EOSINOPHILS ABSOLUTE: 0.32 E9/L (ref 0.05–0.5)
EOSINOPHILS RELATIVE PERCENT: 5.3 % (ref 0–6)
GFR AFRICAN AMERICAN: >60
GFR NON-AFRICAN AMERICAN: >60 ML/MIN/1.73
GLUCOSE BLD-MCNC: 185 MG/DL (ref 74–99)
HCT VFR BLD CALC: 39.9 % (ref 34–48)
HEMOGLOBIN: 12.6 G/DL (ref 11.5–15.5)
IMMATURE GRANULOCYTES #: 0.03 E9/L
IMMATURE GRANULOCYTES %: 0.5 % (ref 0–5)
LYMPHOCYTES ABSOLUTE: 1.27 E9/L (ref 1.5–4)
LYMPHOCYTES RELATIVE PERCENT: 21.1 % (ref 20–42)
MCH RBC QN AUTO: 27.5 PG (ref 26–35)
MCHC RBC AUTO-ENTMCNC: 31.6 % (ref 32–34.5)
MCV RBC AUTO: 86.9 FL (ref 80–99.9)
MONOCYTES ABSOLUTE: 0.35 E9/L (ref 0.1–0.95)
MONOCYTES RELATIVE PERCENT: 5.8 % (ref 2–12)
NEUTROPHILS ABSOLUTE: 4 E9/L (ref 1.8–7.3)
NEUTROPHILS RELATIVE PERCENT: 66.5 % (ref 43–80)
PDW BLD-RTO: 14.8 FL (ref 11.5–15)
PLATELET # BLD: 179 E9/L (ref 130–450)
PMV BLD AUTO: 10.9 FL (ref 7–12)
POTASSIUM SERPL-SCNC: 4.2 MMOL/L (ref 3.5–5)
RBC # BLD: 4.59 E12/L (ref 3.5–5.5)
SODIUM BLD-SCNC: 142 MMOL/L (ref 132–146)
TOTAL PROTEIN: 7.4 G/DL (ref 6.4–8.3)
WBC # BLD: 6 E9/L (ref 4.5–11.5)

## 2019-08-01 PROCEDURE — 86300 IMMUNOASSAY TUMOR CA 15-3: CPT

## 2019-08-01 PROCEDURE — 80053 COMPREHEN METABOLIC PANEL: CPT

## 2019-08-01 PROCEDURE — 85025 COMPLETE CBC W/AUTO DIFF WBC: CPT

## 2019-08-01 PROCEDURE — 36415 COLL VENOUS BLD VENIPUNCTURE: CPT

## 2019-08-03 LAB — CA 27.29: 16.9 U/ML (ref 0–40)

## 2019-08-06 ENCOUNTER — OFFICE VISIT (OUTPATIENT)
Dept: ENT CLINIC | Age: 59
End: 2019-08-06
Payer: COMMERCIAL

## 2019-08-06 VITALS
HEART RATE: 82 BPM | BODY MASS INDEX: 38.32 KG/M2 | DIASTOLIC BLOOD PRESSURE: 75 MMHG | HEIGHT: 65 IN | WEIGHT: 230 LBS | SYSTOLIC BLOOD PRESSURE: 135 MMHG

## 2019-08-06 DIAGNOSIS — J32.4 CHRONIC PANSINUSITIS: Primary | ICD-10-CM

## 2019-08-06 PROCEDURE — 99213 OFFICE O/P EST LOW 20 MIN: CPT | Performed by: OTOLARYNGOLOGY

## 2019-08-06 ASSESSMENT — ENCOUNTER SYMPTOMS
SINUS PRESSURE: 1
FACIAL SWELLING: 0

## 2019-08-06 NOTE — PATIENT INSTRUCTIONS
BACTROBAN NASAL SPRAY     Fill prescription for 22 grams of Bactroban ointment. Purchase a bottle of nasal saline ( Nasal, Ocean or generic)     In a coffee mug mix:  1/3 tube ointment in 44 or 45 ml bottle of saline  OR  2/3 tube ointment in 88 ml bottle of saline  Microwave 15 seconds  Stir and let solution cool  After the solution is cool, poor the liquid back into the empty saline bottle and shake well     Use 2 sprays in each nostril 2 times a day for 2 weeks. When using the Prescription Nasal Spray use your right hand to spray into the left nostril and the left hand to spray into the right nostril. Do Not Sniff after spraying. This must be used daily to get improvement of symptoms which takes at least 2-3 weeks to see any results. May take up to six weeks to get the best improvement.

## 2019-08-06 NOTE — PROGRESS NOTES
[address] [facility] Otolaryngology  Dr. Presley Opitz. Rene Nurse. Ms.Ed        Patient Name:  Octavia Riggins  :  1960     CHIEF C/O:    Chief Complaint   Patient presents with    Results     F/u CT Scan       HISTORY OBTAINED FROM:  patient    HISTORY OF PRESENT ILLNESS:       Nakul Lock is a 60y.o. year old female, here today for follow up of CT scan on 19. Findings include 7mm mucous retention cyst in anterior wall of right maxillary sinus as well as a mucous retention cyst in the left middle ethmoid air cells with minimal bone expansion. Patient had previous sinus surgeries >10yrs ago and has large maxillary antrostomies b/l. Blockage of left frontal sinus also present. Patient's main complaint is thick mucous. She describes facial pressure over maxillary and frontal sinuses periodically. She was seen in the office and started nasal saline rinse, nasal saline spray, Astelin with Flonase on 19 with mild improvement. Past Medical History:   Diagnosis Date    A-fib [facility])     Atrial fibrillation ([facility] [address]     Cancer ([facility] [address] 2006    left breast    Encounter for wound care     stomach  and then    left breast    Heartburn     Lymphedema 2007    Left arm following breast cancer    Mixed hyperlipidemia 2017    Type 2 diabetes mellitus without complication [facility])      Past Surgical History:   Procedure Laterality Date    ABLATION OF DYSRHYTHMIC FOCUS      tachycardia  had ablation x 4, most recent 2006    BREAST SURGERY      left breast ca--bilateral mastectomy    COLONOSCOPY      DIAGNOSTIC CARDIAC CATH LAB PROCEDURE  3/28/06    TMH: Left main: Patent vessel with no significant disease. LAD: Less than 20% ostial stenosis. Rest of LAD no significant disease, long, wrapped around apex. LCX: Patent with no disease. RCA: Patent with no significant disease.  Normal LVF, EF more than 55%, no MR.    HERNIA REPAIR      HYSTERECTOMY  2013    laparoscopic robotic assisted    MASTECTOMY Bilateral

## 2019-08-19 ENCOUNTER — TELEPHONE (OUTPATIENT)
Dept: PHYSICAL MEDICINE AND REHAB | Age: 59
End: 2019-08-19

## 2019-08-22 ENCOUNTER — OFFICE VISIT (OUTPATIENT)
Dept: PHYSICAL MEDICINE AND REHAB | Age: 59
End: 2019-08-22
Payer: COMMERCIAL

## 2019-08-22 VITALS
WEIGHT: 234 LBS | DIASTOLIC BLOOD PRESSURE: 82 MMHG | HEART RATE: 79 BPM | HEIGHT: 65 IN | BODY MASS INDEX: 38.99 KG/M2 | SYSTOLIC BLOOD PRESSURE: 124 MMHG

## 2019-08-22 DIAGNOSIS — E79.0 ELEVATED URIC ACID IN BLOOD: ICD-10-CM

## 2019-08-22 DIAGNOSIS — M65.4 TENOSYNOVITIS, DE QUERVAIN: Primary | ICD-10-CM

## 2019-08-22 DIAGNOSIS — M25.532 LEFT WRIST PAIN: ICD-10-CM

## 2019-08-22 PROCEDURE — 99213 OFFICE O/P EST LOW 20 MIN: CPT | Performed by: PHYSICAL MEDICINE & REHABILITATION

## 2019-08-22 RX ORDER — ALLOPURINOL 100 MG/1
100 TABLET ORAL DAILY
Qty: 90 TABLET | Refills: 1 | Status: SHIPPED
Start: 2019-08-22 | End: 2020-03-11 | Stop reason: SDUPTHER

## 2019-08-22 RX ORDER — CELECOXIB 100 MG/1
100 CAPSULE ORAL 2 TIMES DAILY
Qty: 60 CAPSULE | Refills: 2 | Status: SHIPPED | OUTPATIENT
Start: 2019-08-22

## 2019-08-22 ASSESSMENT — ENCOUNTER SYMPTOMS
VOMITING: 0
SHORTNESS OF BREATH: 0
COUGH: 0

## 2019-08-22 NOTE — PROGRESS NOTES
Sonal Dyson D.O. [place] [facility]  [address] [facility] [place]  Phone: [phone]  Fax: [phone]        8/22/19    Chief Complaint   Patient presents with    Wrist Pain     Follow up, discuss MRI results       HPI:  Jeaneth Mota is a 60y.o. year old woman seen today in follow up regarding wrist pain. Interval history: Since the last visit the patient had MRI showed tenosynovitis and possible EPB tear. She had no improvement with corticosteroid injection or colchicine. Uric acid is high. She has continued to work. She is using her thumb spica brace. She is doing home exercises Today, the pain is rated Pain Score:   5 where 0 is no pain and 10 is pain as bad as it can be. The pain is located in the left radial wrist,  radiates proximally to the left forearm, and is described as burning, aching. This pain occurs all day. The symptoms have been worse since onset. Symptoms are exacerbated by working. Factors which relieve the pain include nothing. Other associated symptoms include swelling, erythema. Otherwise, the pain assessment has not changed since the last visit. Past Medical History:   Diagnosis Date    A-fib [facility])     [facility]rial [facility] [address]     Cancer ([facility] [address] 2006    left breast    Encounter for wound care     stomach  and then    left breast    Heartburn     Lymphedema 2007    Left arm following breast cancer    Mixed hyperlipidemia 2/23/2017    Type 2 diabetes mellitus without complication [facility])        Past Surgical History:   Procedure Laterality Date    ABLATION OF DYSRHYTHMIC FOCUS      tachycardia  had ablation x 4, most recent 06/2006    BREAST SURGERY  2007    left breast ca--bilateral mastectomy    COLONOSCOPY      DIAGNOSTIC CARDIAC CATH LAB PROCEDURE  3/28/06    TMH: Left main: Patent vessel with no significant disease. LAD: Less than 20% ostial stenosis. Rest of LAD no significant disease, long, wrapped around apex. Hygiene is appropriate. CARDIOVASCULAR  Heart is regular rate and rhythm. There is no edema. RESPIRATORY: Respirations are regular and unlabored. There is no cyanosis. GASTROINTESTINAL: Soft abdomen, non-tender. MSK: There is erythema and warmth of the radial left wrist.  Finkelstein's is positive. Otherwise, there is no joint effusion, deformity, instability, swelling, erythema or warmth. AROM is full in the spine and extremities. Spinal curvatures are normal.    NEURO: Gait is normal. No focal sensorimotor deficit. Reflexes 2+ and symmetric in lower extremities. Impression:   1. Tenosynovitis, de Quervain    2. Left wrist pain    3. Elevated uric acid in blood        Plan:  Orders Placed This Encounter   Procedures   Wilhelminia Castleman, MD, Orthopaedics (hand & upper extremities), [facility]     Referral Priority:   Routine     Referral Type:   Eval and Treat     Referral Reason:   Specialty Services Required     Referred to Provider:   Jeanne Winters MD     Requested Specialty:   Hand Surgery     Number of Visits Requested:   1       Orders Placed This Encounter   Medications    allopurinol (ZYLOPRIM) 100 MG tablet     Sig: Take 1 tablet by mouth daily     Dispense:  90 tablet     Refill:  1    celecoxib (CELEBREX) 100 MG capsule     Sig: Take 1 capsule by mouth 2 times daily     Dispense:  60 capsule     Refill:  2     Continue thumb spica, ice 20 minutes on 20 minutes off, relative rest, activity modifications and home exercises. The patient was educated about the diagnosis, prognosis, indications, risks and benefits of treatment. An opportunity to ask questions was given to the patient and questions were answered. The patient agreed to proceed with the recommended treatment as described above. No follow-ups on file. Thank you for allowing me to participate in the care of your patient. Reyes Crofts, D.O., P.T.   Board Certified Physical Medicine and

## 2019-09-17 ENCOUNTER — OFFICE VISIT (OUTPATIENT)
Dept: ORTHOPEDIC SURGERY | Age: 59
End: 2019-09-17
Payer: COMMERCIAL

## 2019-09-17 ENCOUNTER — TELEPHONE (OUTPATIENT)
Dept: PHYSICAL MEDICINE AND REHAB | Age: 59
End: 2019-09-17

## 2019-09-17 VITALS
SYSTOLIC BLOOD PRESSURE: 138 MMHG | HEART RATE: 75 BPM | BODY MASS INDEX: 35.82 KG/M2 | WEIGHT: 215 LBS | RESPIRATION RATE: 18 BRPM | TEMPERATURE: 98.1 F | HEIGHT: 65 IN | DIASTOLIC BLOOD PRESSURE: 84 MMHG

## 2019-09-17 DIAGNOSIS — G56.02 CARPAL TUNNEL SYNDROME OF LEFT WRIST: Primary | ICD-10-CM

## 2019-09-17 DIAGNOSIS — M65.4 DE QUERVAIN'S TENOSYNOVITIS, LEFT: ICD-10-CM

## 2019-09-17 PROCEDURE — 20550 NJX 1 TENDON SHEATH/LIGAMENT: CPT | Performed by: ORTHOPAEDIC SURGERY

## 2019-09-17 PROCEDURE — 99204 OFFICE O/P NEW MOD 45 MIN: CPT | Performed by: ORTHOPAEDIC SURGERY

## 2019-09-17 RX ORDER — BETAMETHASONE SODIUM PHOSPHATE AND BETAMETHASONE ACETATE 3; 3 MG/ML; MG/ML
6 INJECTION, SUSPENSION INTRA-ARTICULAR; INTRALESIONAL; INTRAMUSCULAR; SOFT TISSUE ONCE
Status: COMPLETED | OUTPATIENT
Start: 2019-09-17 | End: 2019-09-18

## 2019-09-17 NOTE — PROGRESS NOTES
Department of Orthopedic Surgery  History and Physical      CHIEF COMPLAINT:  Left wrist pain    HISTORY OF PRESENT ILLNESS:                The patient is a LHD 60 y.o. female who presents with left wrist pain. Patient states that she has had left radial sided wrist pain for the last 3 years. Dr. Amador Gupta has seen her who has injected her twice to the radial aspect of her wrist.  She states this is significantly helped her pain. Her last injection was on the ulnar aspect of her wrist which did not help her symptoms. She reports swelling and pain. Reports nocturnal symptoms of numbness and tingling once a week. She has had no treatments for this. She does have history of lymphedema on the left upper extremity. Past Medical History:        Diagnosis Date    A-fib [facility])     [facility] ([facility] [address]     Cancer ([facility] [address] 2006    left breast    Encounter for wound care     stomach  and then    left breast    Heartburn     Lymphedema 2007    Left arm following breast cancer    Mixed hyperlipidemia 2/23/2017    Type 2 diabetes mellitus without complication [facility])      Past Surgical History:        Procedure Laterality Date    ABLATION OF DYSRHYTHMIC FOCUS      tachycardia  had ablation x 4, most recent 06/2006    BREAST SURGERY  2007    left breast ca--bilateral mastectomy    COLONOSCOPY      DIAGNOSTIC CARDIAC CATH LAB PROCEDURE  3/28/06    TMH: Left main: Patent vessel with no significant disease. LAD: Less than 20% ostial stenosis. Rest of LAD no significant disease, long, wrapped around apex. LCX: Patent with no disease. RCA: Patent with no significant disease. Normal LVF, EF more than 55%, no MR.    HERNIA REPAIR      HYSTERECTOMY  feb 2013    laparoscopic robotic assisted    MASTECTOMY Bilateral 2007     Current Medications:   No current facility-administered medications for this visit. Allergies:  Patient has no known allergies.     Social History:   TOBACCO:   reports that she quit smoking about 13 years ago. Her smoking use included cigarettes. She started smoking about 42 years ago. She smoked 1.00 pack per day. She has never used smokeless tobacco.  ETOH:   reports that she drinks alcohol. DRUGS:   reports that she does not use drugs. ACTIVITIES OF DAILY LIVING:    OCCUPATION:    Family History:       Problem Relation Age of Onset    Heart Failure Mother          at 66    Heart Disease Father     Cancer Father         Pancreatic Cancer  at 76    Diabetes Sister         MRSA pneumonia    Other Brother         Valvular heart disease    No Known Problems Brother     Diabetes Brother        REVIEW OF SYSTEMS:  CONSTITUTIONAL:  negative  EYES:  negative  HEENT:  negative  RESPIRATORY:  negative  CARDIOVASCULAR:  afib  GASTROINTESTINAL:  negative  GENITOURINARY:  negative  INTEGUMENT/BREAST:  negative  HEMATOLOGIC/LYMPHATIC:  negative  ALLERGIC/IMMUNOLOGIC:  negative  ENDOCRINE:  DM  MUSCULOSKELETAL:  pain  NEUROLOGICAL:  N/T  BEHAVIOR/PSYCH:  negative    PHYSICAL EXAM:    VITALS:  /84 (Site: Left Upper Arm, Position: Sitting)   Pulse 75   Temp 98.1 °F (36.7 °C) (Oral)   Resp 18   Ht 5' 5\" (1.651 m)   Wt 215 lb (97.5 kg)   BMI 35.78 kg/m²   CONSTITUTIONAL:  awake, alert, cooperative, no apparent distress, and appears stated age  EYES:  Lids and lashes normal, pupils equal, round and reactive to light, extra ocular muscles intact, sclera clear, conjunctiva normal  ENT:  Normocephalic, without obvious abnormality, atraumatic, sinuses nontender on palpation, external ears without lesions, oral pharynx with moist mucus membranes, tonsils without erythema or exudates, gums normal and good dentition.   NECK:  Supple, symmetrical, trachea midline, no adenopathy, thyroid symmetric, not enlarged and no tenderness, skin normal  LUNGS:  CTA  CARDIOVASCULAR:  2+ radial pulses, extremities warm and well perfused  ABDOMEN:  obese, NTTP  CHEST:  Atraumatic   GENITAL/URINARY:  deferred  NEUROLOGIC: Awake, alert, oriented to name, place and time. Cranial nerves II-XII are grossly intact. Motor is 5 out of 5 bilaterally. Sensory is intact.  gait is normal.  MUSCULOSKELETAL:    Left upper extremity: Lymphedema throughout the extremity. Nontender about the shoulder and elbow with good range of motion. Negative Tinel's at the cubital tunnel, positive Tinel's at the carpal tunnel, positive Edgard's, very positive tenderness over the first extensor compartment with positive Finkelstein's. Ganglion cyst present over the 1st extensor compartment. Nontender over the A1 pulleys with no active triggering. Negative CMC grind. Full flexion extension of the fingers. Negative Wartenberg's and cross finger testing. APB strength 5 out of 5 with no atrophy. Median, ulnar, radial nerves intact light touch. Brisk capillary refill. DATA:    CBC:   Lab Results   Component Value Date    WBC 6.0 08/01/2019    RBC 4.59 08/01/2019    HGB 12.6 08/01/2019    HCT 39.9 08/01/2019    MCV 86.9 08/01/2019    MCH 27.5 08/01/2019    MCHC 31.6 08/01/2019    RDW 14.8 08/01/2019     08/01/2019    MPV 10.9 08/01/2019     PT/INR:  No results found for: PROTIME, INR    Radiology Review: X-rays of the left wrist were reviewed from July 23 of 2019.  3 views: AP, lateral, oblique demonstrate no acute fractures or dislocations of the left wrist.  Impression: No acute fractures or dislocations of left wrist.    MRI of the left wrist was reviewed from August 16, 2019. Coronal, sagittal, axial images were reviewed demonstrating fluid surrounding the extensor pollicis brevis tendon and abductor pollicis longus tendon with a linear defect through the extensor pollicis tendon.   Impression: Tenosynovitis involving the extensor pollicis brevis and abductor pollicis longus with partial tear of the extensor pollicis brevis    IMPRESSION:  · Left wrist de Quervain's tenosynovitis  · Left carpal tunnel syndrome    PLAN:  Discussed findings with patient. Discussed conservative and surgical management with patient. Patient like to try a cortisone injection at today's visit along the first extensor compartment. She also would like to schedule surgery we will plan for a left de Quervain's release and a left endoscopic carpal tunnel release. Postoperative course explained to the patient. All questions answered. Procedure Note DeQuerveins Injection    The left first dorsal wrist compartment was identified as the injection site. The risk and benefits of a cortisone injection were explained and the patient consented to the injection. Under sterile conditions, the wrist was injected with a mixture of 1 mL of 1% Lidocaine and 1 mL of 6 mg/mL Betamethasone without complication. A sterile bandage was applied. I explained the risks, benefits, alternatives and complications of surgery with the patient including but not limited to the risks of infection, possible damage to nerves, vessels, or tendons, stiffness, loss of range of motion, scar sensitivity, wound healing complications, worsening symptoms, possible need for therapy, as well as the possible need further surgery and unanticipated complications. The patient voiced understanding and all questions were answered. The patient elected to proceed with surgical intervention. I have seen and evaluated the patient and agree with the above assessment and plan on today's visit. I have performed the key components of the history and physical examination with significant findings of left de Quervain's tenosynovitis. And left carpal tunnel syndrome. Plan for an endoscopic carpal tunnel release and de Quervain's release. Injection provided. I concur with the findings and plan as documented.     Nabor Basurto MD  9/17/2019

## 2019-09-18 RX ADMIN — BETAMETHASONE SODIUM PHOSPHATE AND BETAMETHASONE ACETATE 6 MG: 3; 3 INJECTION, SUSPENSION INTRA-ARTICULAR; INTRALESIONAL; INTRAMUSCULAR; SOFT TISSUE at 08:03

## 2019-10-03 ENCOUNTER — PATIENT MESSAGE (OUTPATIENT)
Dept: PHARMACY | Facility: CLINIC | Age: 59
End: 2019-10-03

## 2019-10-16 ENCOUNTER — APPOINTMENT (OUTPATIENT)
Dept: GENERAL RADIOLOGY | Age: 59
End: 2019-10-16
Payer: COMMERCIAL

## 2019-10-16 ENCOUNTER — HOSPITAL ENCOUNTER (EMERGENCY)
Age: 59
Discharge: HOME | End: 2019-10-16
Payer: COMMERCIAL

## 2019-10-16 VITALS
HEART RATE: 90 BPM | WEIGHT: 215 LBS | BODY MASS INDEX: 35.78 KG/M2 | RESPIRATION RATE: 16 BRPM | TEMPERATURE: 98.1 F | OXYGEN SATURATION: 94 % | SYSTOLIC BLOOD PRESSURE: 128 MMHG | DIASTOLIC BLOOD PRESSURE: 76 MMHG

## 2019-10-16 DIAGNOSIS — J40 BRONCHITIS: Primary | ICD-10-CM

## 2019-10-16 PROCEDURE — 94640 AIRWAY INHALATION TREATMENT: CPT

## 2019-10-16 PROCEDURE — 6370000000 HC RX 637 (ALT 250 FOR IP): Performed by: NURSE PRACTITIONER

## 2019-10-16 PROCEDURE — 71046 X-RAY EXAM CHEST 2 VIEWS: CPT

## 2019-10-16 PROCEDURE — 99213 OFFICE O/P EST LOW 20 MIN: CPT

## 2019-10-16 RX ORDER — PREDNISONE 20 MG/1
40 TABLET ORAL DAILY
Qty: 10 TABLET | Refills: 0 | Status: SHIPPED | OUTPATIENT
Start: 2019-10-16 | End: 2019-10-21

## 2019-10-16 RX ORDER — IPRATROPIUM BROMIDE AND ALBUTEROL SULFATE 2.5; .5 MG/3ML; MG/3ML
1 SOLUTION RESPIRATORY (INHALATION) ONCE
Status: COMPLETED | OUTPATIENT
Start: 2019-10-16 | End: 2019-10-16

## 2019-10-16 RX ORDER — GUAIFENESIN/DEXTROMETHORPHAN 100-10MG/5
10 SYRUP ORAL 4 TIMES DAILY PRN
Qty: 120 ML | Refills: 0 | Status: SHIPPED | OUTPATIENT
Start: 2019-10-16 | End: 2019-10-26

## 2019-10-16 RX ADMIN — IPRATROPIUM BROMIDE AND ALBUTEROL SULFATE 1 AMPULE: .5; 3 SOLUTION RESPIRATORY (INHALATION) at 12:33

## 2019-11-19 ENCOUNTER — HOSPITAL ENCOUNTER (OUTPATIENT)
Age: 59
Discharge: HOME | End: 2019-11-21
Payer: COMMERCIAL

## 2019-11-19 DIAGNOSIS — Z79.4 TYPE 2 DIABETES MELLITUS WITHOUT COMPLICATION, WITH LONG-TERM CURRENT USE OF INSULIN (HCC): ICD-10-CM

## 2019-11-19 DIAGNOSIS — E11.9 TYPE 2 DIABETES MELLITUS WITHOUT COMPLICATION, WITH LONG-TERM CURRENT USE OF INSULIN (HCC): ICD-10-CM

## 2019-11-19 LAB — HBA1C MFR BLD: 6.3 % (ref 4–5.6)

## 2019-11-19 PROCEDURE — 83036 HEMOGLOBIN GLYCOSYLATED A1C: CPT

## 2019-12-10 ENCOUNTER — PREP FOR PROCEDURE (OUTPATIENT)
Dept: ORTHOPEDIC SURGERY | Age: 59
End: 2019-12-10

## 2019-12-10 RX ORDER — SODIUM CHLORIDE 0.9 % (FLUSH) 0.9 %
10 SYRINGE (ML) INJECTION PRN
Status: CANCELLED | OUTPATIENT
Start: 2019-12-10

## 2019-12-10 RX ORDER — SODIUM CHLORIDE 9 MG/ML
INJECTION, SOLUTION INTRAVENOUS CONTINUOUS
Status: CANCELLED | OUTPATIENT
Start: 2019-12-10

## 2019-12-10 RX ORDER — SODIUM CHLORIDE 0.9 % (FLUSH) 0.9 %
10 SYRINGE (ML) INJECTION EVERY 12 HOURS SCHEDULED
Status: CANCELLED | OUTPATIENT
Start: 2019-12-10

## 2019-12-23 NOTE — PROGRESS NOTES
[facility] PRE-ADMISSION TESTING INSTRUCTIONS    The Preadmission Testing patient is instructed accordingly using the following criteria (check applicable):    ARRIVAL INSTRUCTIONS:  [x] Parking the day of Surgery is located in the Main Entrance lot. Upon entering the door, make an immediate right to the surgery reception desk    [] [id number] is available Monday through Friday 6 am to 6 pm    [x] Bring photo ID and insurance card    [] Bring in a copy of Living will or Durable Power of  papers. [x] Please be sure to arrange for responsible adult to provide transportation to and from the hospital    [x] Please arrange for responsible adult to be with you for the 24 hour period post procedure due to having anesthesia      GENERAL INSTRUCTIONS:    [x] Nothing by mouth after midnight, including gum, candy, mints or water    [x] You may brush your teeth, but do not swallow any water    [x] Take medications as instructed with 1-2 oz of water    [x] Stop herbal supplements and vitamins 5 days prior to procedure    [x] Follow preop dosing of blood thinners per physician instructions    [x] Take 1/2 dose of evening insulin, but no insulin after midnight    [x] No oral diabetic medications after midnight    [x] If diabetic and have low blood sugar or feel symptomatic, take 1-2oz apple juice only    [x] Bring inhalers day of surgery    [] Bring C-PAP/ Bi-Pap day of surgery    [] Bring urine specimen day of surgery    [x] Shower or bath with soap, lather and rinse well, AM of Surgery, no lotion, powders or creams to surgical site    [] Follow bowel prep as instructed per surgeon    [x] No tobacco products within 24 hours of surgery     [x] No alcohol or illegal drug use within 24 hours of surgery.     [x] Jewelry, body piercing's, eyeglasses, contact lenses and dentures are not permitted into surgery (bring cases)      [x] Please do not wear any nail polish, make up or hair products on the day of surgery    [x] If not already done, you can expect a call from registration    [x] You can expect a call the business day prior to procedure to notify you if your arrival time changes    [x] If you receive a survey after surgery we would greatly appreciate your comments    [] Parent/guardian of a minor must accompany their child and remain on the premises  the entire time they are under our care     [] Pediatric patients may bring favorite toy, blanket or comfort item with them    [x] A caregiver or family member must remain with the patient during their stay if they are mentally handicapped, have dementia, disoriented or unable to use a call light or would be a safety concern if left unattended    [x] Please notify surgeon if you develop any illness between now and time of surgery (cold, cough, sore throat, fever, nausea, vomiting) or any signs of infections  including skin, wounds, and dental.    [x]  The Outpatient Pharmacy is available to fill your prescription here on your day of surgery, ask your preop nurse for details    [x] Other instructions  EDUCATIONAL MATERIALS PROVIDED:    [] PAT Preoperative Education Packet/Booklet     [] Medication List    [] Fluoroscopy Information Pamphlet    [] Transfusion bracelet applied with instructions    [] Joint replacement video reviewed    [] Shower with soap, lather and rinse well, and use CHG wipes provided the evening before surgery as instructed

## 2019-12-30 ENCOUNTER — ANESTHESIA EVENT (OUTPATIENT)
Dept: OPERATING ROOM | Age: 59
End: 2019-12-30
Payer: COMMERCIAL

## 2019-12-31 ENCOUNTER — HOSPITAL ENCOUNTER (OUTPATIENT)
Age: 59
Setting detail: OUTPATIENT SURGERY
Discharge: HOME | End: 2019-12-31
Attending: ORTHOPAEDIC SURGERY | Admitting: ORTHOPAEDIC SURGERY
Payer: COMMERCIAL

## 2019-12-31 ENCOUNTER — ANESTHESIA (OUTPATIENT)
Dept: OPERATING ROOM | Age: 59
End: 2019-12-31
Payer: COMMERCIAL

## 2019-12-31 VITALS — OXYGEN SATURATION: 99 % | DIASTOLIC BLOOD PRESSURE: 66 MMHG | SYSTOLIC BLOOD PRESSURE: 117 MMHG

## 2019-12-31 VITALS
DIASTOLIC BLOOD PRESSURE: 77 MMHG | SYSTOLIC BLOOD PRESSURE: 122 MMHG | OXYGEN SATURATION: 98 % | TEMPERATURE: 96.4 F | WEIGHT: 230 LBS | BODY MASS INDEX: 38.32 KG/M2 | HEIGHT: 65 IN | RESPIRATION RATE: 18 BRPM | HEART RATE: 76 BPM

## 2019-12-31 LAB
EKG ATRIAL RATE: 74 BPM
EKG P AXIS: 62 DEGREES
EKG P-R INTERVAL: 246 MS
EKG Q-T INTERVAL: 416 MS
EKG QRS DURATION: 94 MS
EKG QTC CALCULATION (BAZETT): 461 MS
EKG R AXIS: 84 DEGREES
EKG T AXIS: 59 DEGREES
EKG VENTRICULAR RATE: 74 BPM
METER GLUCOSE: 118 MG/DL (ref 74–99)

## 2019-12-31 PROCEDURE — 29848 WRIST ENDOSCOPY/SURGERY: CPT | Performed by: ORTHOPAEDIC SURGERY

## 2019-12-31 PROCEDURE — 93010 ELECTROCARDIOGRAM REPORT: CPT | Performed by: INTERNAL MEDICINE

## 2019-12-31 PROCEDURE — 6360000002 HC RX W HCPCS: Performed by: NURSE ANESTHETIST, CERTIFIED REGISTERED

## 2019-12-31 PROCEDURE — 82962 GLUCOSE BLOOD TEST: CPT

## 2019-12-31 PROCEDURE — 7100000011 HC PHASE II RECOVERY - ADDTL 15 MIN: Performed by: ORTHOPAEDIC SURGERY

## 2019-12-31 PROCEDURE — 2580000003 HC RX 258: Performed by: NURSE ANESTHETIST, CERTIFIED REGISTERED

## 2019-12-31 PROCEDURE — 6360000002 HC RX W HCPCS: Performed by: PHYSICIAN ASSISTANT

## 2019-12-31 PROCEDURE — 7100000010 HC PHASE II RECOVERY - FIRST 15 MIN: Performed by: ORTHOPAEDIC SURGERY

## 2019-12-31 PROCEDURE — 3600000003 HC SURGERY LEVEL 3 BASE: Performed by: ORTHOPAEDIC SURGERY

## 2019-12-31 PROCEDURE — 6370000000 HC RX 637 (ALT 250 FOR IP): Performed by: ANESTHESIOLOGY

## 2019-12-31 PROCEDURE — 2709999900 HC NON-CHARGEABLE SUPPLY: Performed by: ORTHOPAEDIC SURGERY

## 2019-12-31 PROCEDURE — 25000 INCISION OF TENDON SHEATH: CPT | Performed by: ORTHOPAEDIC SURGERY

## 2019-12-31 PROCEDURE — 93005 ELECTROCARDIOGRAM TRACING: CPT | Performed by: ANESTHESIOLOGY

## 2019-12-31 PROCEDURE — 3700000000 HC ANESTHESIA ATTENDED CARE: Performed by: ORTHOPAEDIC SURGERY

## 2019-12-31 PROCEDURE — 2500000003 HC RX 250 WO HCPCS: Performed by: ORTHOPAEDIC SURGERY

## 2019-12-31 PROCEDURE — 3600000013 HC SURGERY LEVEL 3 ADDTL 15MIN: Performed by: ORTHOPAEDIC SURGERY

## 2019-12-31 PROCEDURE — 2720000010 HC SURG SUPPLY STERILE: Performed by: ORTHOPAEDIC SURGERY

## 2019-12-31 PROCEDURE — 3700000001 HC ADD 15 MINUTES (ANESTHESIA): Performed by: ORTHOPAEDIC SURGERY

## 2019-12-31 RX ORDER — FENTANYL CITRATE 50 UG/ML
INJECTION, SOLUTION INTRAMUSCULAR; INTRAVENOUS PRN
Status: DISCONTINUED | OUTPATIENT
Start: 2019-12-31 | End: 2019-12-31 | Stop reason: SDUPTHER

## 2019-12-31 RX ORDER — SODIUM CHLORIDE 0.9 % (FLUSH) 0.9 %
10 SYRINGE (ML) INJECTION PRN
Status: DISCONTINUED | OUTPATIENT
Start: 2019-12-31 | End: 2019-12-31 | Stop reason: HOSPADM

## 2019-12-31 RX ORDER — SODIUM CHLORIDE 0.9 % (FLUSH) 0.9 %
10 SYRINGE (ML) INJECTION EVERY 12 HOURS SCHEDULED
Status: DISCONTINUED | OUTPATIENT
Start: 2019-12-31 | End: 2019-12-31 | Stop reason: HOSPADM

## 2019-12-31 RX ORDER — MEPERIDINE HYDROCHLORIDE 25 MG/ML
12.5 INJECTION INTRAMUSCULAR; INTRAVENOUS; SUBCUTANEOUS EVERY 10 MIN PRN
Status: DISCONTINUED | OUTPATIENT
Start: 2019-12-31 | End: 2019-12-31 | Stop reason: HOSPADM

## 2019-12-31 RX ORDER — HYDROCODONE BITARTRATE AND ACETAMINOPHEN 5; 325 MG/1; MG/1
1 TABLET ORAL
Status: COMPLETED | OUTPATIENT
Start: 2019-12-31 | End: 2019-12-31

## 2019-12-31 RX ORDER — SODIUM CHLORIDE 9 MG/ML
INJECTION, SOLUTION INTRAVENOUS CONTINUOUS PRN
Status: DISCONTINUED | OUTPATIENT
Start: 2019-12-31 | End: 2019-12-31 | Stop reason: SDUPTHER

## 2019-12-31 RX ORDER — FENTANYL CITRATE 50 UG/ML
50 INJECTION, SOLUTION INTRAMUSCULAR; INTRAVENOUS EVERY 5 MIN PRN
Status: DISCONTINUED | OUTPATIENT
Start: 2019-12-31 | End: 2019-12-31 | Stop reason: HOSPADM

## 2019-12-31 RX ORDER — PROMETHAZINE HYDROCHLORIDE 25 MG/ML
6.25 INJECTION, SOLUTION INTRAMUSCULAR; INTRAVENOUS
Status: DISCONTINUED | OUTPATIENT
Start: 2019-12-31 | End: 2019-12-31 | Stop reason: HOSPADM

## 2019-12-31 RX ORDER — CEFAZOLIN SODIUM 2 G/50ML
2 SOLUTION INTRAVENOUS
Status: COMPLETED | OUTPATIENT
Start: 2019-12-31 | End: 2019-12-31

## 2019-12-31 RX ORDER — MIDAZOLAM HYDROCHLORIDE 1 MG/ML
INJECTION INTRAMUSCULAR; INTRAVENOUS PRN
Status: DISCONTINUED | OUTPATIENT
Start: 2019-12-31 | End: 2019-12-31 | Stop reason: SDUPTHER

## 2019-12-31 RX ORDER — HYDROCODONE BITARTRATE AND ACETAMINOPHEN 5; 325 MG/1; MG/1
1 TABLET ORAL EVERY 6 HOURS PRN
Qty: 12 TABLET | Refills: 0 | Status: SHIPPED | OUTPATIENT
Start: 2019-12-31 | End: 2020-01-07

## 2019-12-31 RX ORDER — DIPHENHYDRAMINE HYDROCHLORIDE 50 MG/ML
12.5 INJECTION INTRAMUSCULAR; INTRAVENOUS
Status: DISCONTINUED | OUTPATIENT
Start: 2019-12-31 | End: 2019-12-31 | Stop reason: HOSPADM

## 2019-12-31 RX ORDER — LIDOCAINE HYDROCHLORIDE AND EPINEPHRINE 10; 10 MG/ML; UG/ML
INJECTION, SOLUTION INFILTRATION; PERINEURAL PRN
Status: DISCONTINUED | OUTPATIENT
Start: 2019-12-31 | End: 2019-12-31 | Stop reason: ALTCHOICE

## 2019-12-31 RX ORDER — SODIUM CHLORIDE 9 MG/ML
INJECTION, SOLUTION INTRAVENOUS CONTINUOUS
Status: DISCONTINUED | OUTPATIENT
Start: 2019-12-31 | End: 2019-12-31 | Stop reason: HOSPADM

## 2019-12-31 RX ORDER — PROPOFOL 10 MG/ML
INJECTION, EMULSION INTRAVENOUS CONTINUOUS PRN
Status: DISCONTINUED | OUTPATIENT
Start: 2019-12-31 | End: 2019-12-31 | Stop reason: SDUPTHER

## 2019-12-31 RX ADMIN — FENTANYL CITRATE 100 MCG: 50 INJECTION, SOLUTION INTRAMUSCULAR; INTRAVENOUS at 08:42

## 2019-12-31 RX ADMIN — HYDROCODONE BITARTRATE AND ACETAMINOPHEN 1 TABLET: 5; 325 TABLET ORAL at 10:09

## 2019-12-31 RX ADMIN — SODIUM CHLORIDE: 9 INJECTION, SOLUTION INTRAVENOUS at 08:38

## 2019-12-31 RX ADMIN — MIDAZOLAM 2 MG: 1 INJECTION INTRAMUSCULAR; INTRAVENOUS at 08:38

## 2019-12-31 RX ADMIN — PROPOFOL 100 MCG/KG/MIN: 10 INJECTION, EMULSION INTRAVENOUS at 08:42

## 2019-12-31 RX ADMIN — CEFAZOLIN SODIUM 2 G: 2 SOLUTION INTRAVENOUS at 08:38

## 2019-12-31 ASSESSMENT — PULMONARY FUNCTION TESTS
PIF_VALUE: 0
PIF_VALUE: 1
PIF_VALUE: 0

## 2019-12-31 ASSESSMENT — PAIN DESCRIPTION - DESCRIPTORS: DESCRIPTORS: ACHING;DISCOMFORT

## 2019-12-31 ASSESSMENT — PAIN SCALES - GENERAL
PAINLEVEL_OUTOF10: 4
PAINLEVEL_OUTOF10: 4
PAINLEVEL_OUTOF10: 2

## 2019-12-31 ASSESSMENT — PAIN DESCRIPTION - LOCATION: LOCATION: WRIST

## 2019-12-31 ASSESSMENT — PAIN - FUNCTIONAL ASSESSMENT: PAIN_FUNCTIONAL_ASSESSMENT: 0-10

## 2019-12-31 ASSESSMENT — PAIN DESCRIPTION - PAIN TYPE
TYPE: SURGICAL PAIN
TYPE: SURGICAL PAIN

## 2019-12-31 ASSESSMENT — PAIN DESCRIPTION - ORIENTATION: ORIENTATION: LEFT

## 2019-12-31 NOTE — BRIEF OP NOTE
Brief Postoperative Note  ______________________________________________________________    Patient: Carmen Houser  YOB: 1960  MRN: [id number]  Date of Procedure: 12/31/2019    Pre-Op Diagnosis: LEFT CARPAL TUNNEL SYNDROME LEFT DEQUERVAINS TENOSYNOVITIS    Post-Op Diagnosis: Same       Procedure(s):  LEFT ENDOSCOPIC CARPAL TUNNEL RELEASE LEFT  DEQUERVAINS RELEASE    Anesthesia: Monitor Anesthesia Care    Surgeon(s):  Rachel Sotelo MD    Assistant: Martina Wise    Estimated Blood Loss (mL): less than 50     Complications: None    Specimens:   * No specimens in log *    Implants:  * No implants in log *      Drains: * No LDAs found *    Findings: see op note    BELIA Maldonado  Date: 12/31/2019  Time: 8:53 AM

## 2019-12-31 NOTE — OP NOTE
DATE OF PROCEDURE: 12/31/2019  SURGEON: Janie Link M.D.  ASSISTANT: Dr Clarice Major orthopedic resident  PREOPERATIVE DIAGNOSIS:   1. left carpal tunnel syndrome. 2.  left De Quervain's tenosynovitis. POSTOPERATIVE DIAGNOSIS:   1. left carpal tunnel syndrome. 2.  left De Quervain's tenosynovitis. OPERATION:  1.  left endoscopic carpal tunnel release using the [address] [place] [address] Street. 2. left De Quervain's release    ANESTHESIA: (1) Monitored anesthesia care. (2) Local anesthetic by the  surgeon consisting of approximately 10 mL of 1% lidocaine with epinephrine. ESTIMATED BLOOD LOSS: Minimal  COMPLICATIONS: None. TOURNIQUET TIME: Total tourniquet time was approximately 9 minutes at 250  mmHg via brachial tourniquet. FINDINGS: (1) Evidence of median nerve compression beneath the transverse  carpal ligament. It was adequately decompressed with release of the  transverse carpal ligament. (2) Direct visualization of the median nerve  confirmed decompression through the carpal tunnel as well as distal forearm. 3.  Evidence of tenosynovitis along the first dorsal extensor compartment  with separate and distinct intercompartmental septum to both the EPB and  APL tendons. 4.  Status post tenosynovectomy and fist compartment release, there was no  significant instability to the tendons.     DISPOSITION: Patient was stable throughout the procedure. INDICATION:  After failing conservative carpal tunnel syndrome  management, the patient wished to proceed with carpal tunnel release. Risks,  benefits, alternatives and complications were fully explained to her  including but not limited the risks of infection, damage to  nerves/vessels/tendons, failure to relieve her symptoms or worsening of  symptoms, pillar pain, thenar pain, hypothenar pain, scar sensitivity,  possible need for rehabilitation. She understood and wished to proceed. SURGERY IN DETAIL: The patient was identified in the holding area.  The placed on the  undersurface of the transverse carpal ligament, between the undersurface of  the ligament and the median nerve and advanced from proximal to distal,  palpating the undersurface of the transverse carpal ligament, the distal  margin of the transverse carpal ligament as well as the hook of the hamate. This was then followed by sequential dilation and trial broach. With a clear  path identified, the MicroAire endoscopic SlimLine device was then advanced  in a similar fashion beneath the undersurface of the transverse carpal  ligament between the ligament and the median nerve, palpating the hook of the  hamate and clearly identifying the transverse fibers on the video monitor  from proximal to distally. The distal fat pad was identified and the blade  was deployed releasing the distal margin of the transverse carpal ligament. The blade was retracted, confirming complete division of the ligament distally  followed by redeployment of the blade which was then brought out proximally to fully release the carpal tunnel with release of the transverse carpal  ligament. There was radial and ulnar retraction of the leaflets. A Ragnell  elevator was inserted distally, confirming complete division of the ligament  as well as decompression of the nerve throughout the visualized course. The  remaining portion of the distal forearm fascia was released under direct  visualization more proximally, fully decompressing the nerve. The wound was copiously irrigated out. The tourniquet was deflated and hemostasis was achieved with bipolar cautery. There was brisk cap refill of all digits and the hand was pink and warm . The skin was closed with Nylon sutures. A soft sterile dressing was applied. The patient tolerated the procedure well and was taken to the recovery room.     Electronically signed by Darin Lazaro MD on 12/31/2019 at 11:03 AM

## 2019-12-31 NOTE — H&P
visit. Allergies:  Patient has no known allergies.     Social History:   TOBACCO:   reports that she quit smoking about 13 years ago. Her smoking use included cigarettes. She started smoking about 42 years ago. She smoked 1.00 pack per day. She has never used smokeless tobacco.  ETOH:   reports that she drinks alcohol. DRUGS:   reports that she does not use drugs. ACTIVITIES OF DAILY LIVING:    OCCUPATION:    Family History:   Family History             Problem Relation Age of Onset    Heart Failure Mother            at 66    Heart Disease Father      Cancer Father           Pancreatic Cancer  at 76    Diabetes Sister           MRSA pneumonia    Other Brother           Valvular heart disease    No Known Problems Brother      Diabetes Brother              REVIEW OF SYSTEMS:  CONSTITUTIONAL:  negative  EYES:  negative  HEENT:  negative  RESPIRATORY:  negative  CARDIOVASCULAR:  afib  GASTROINTESTINAL:  negative  GENITOURINARY:  negative  INTEGUMENT/BREAST:  negative  HEMATOLOGIC/LYMPHATIC:  negative  ALLERGIC/IMMUNOLOGIC:  negative  ENDOCRINE:  DM  MUSCULOSKELETAL:  pain  NEUROLOGICAL:  N/T  BEHAVIOR/PSYCH:  negative     PHYSICAL EXAM:    VITALS:  /84 (Site: Left Upper Arm, Position: Sitting)   Pulse 75   Temp 98.1 °F (36.7 °C) (Oral)   Resp 18   Ht 5' 5\" (1.651 m)   Wt 215 lb (97.5 kg)   BMI 35.78 kg/m²   CONSTITUTIONAL:  awake, alert, cooperative, no apparent distress, and appears stated age  EYES:  Lids and lashes normal, pupils equal, round and reactive to light, extra ocular muscles intact, sclera clear, conjunctiva normal  ENT:  Normocephalic, without obvious abnormality, atraumatic, sinuses nontender on palpation, external ears without lesions, oral pharynx with moist mucus membranes, tonsils without erythema or exudates, gums normal and good dentition.   NECK:  Supple, symmetrical, trachea midline, no adenopathy, thyroid symmetric, not enlarged and no tenderness, skin normal  LUNGS:  CTA  CARDIOVASCULAR:  2+ radial pulses, extremities warm and well perfused  ABDOMEN:  obese, NTTP  CHEST:  Atraumatic   GENITAL/URINARY:  deferred  NEUROLOGIC:  Awake, alert, oriented to name, place and time. Cranial nerves II-XII are grossly intact. Motor is 5 out of 5 bilaterally. Sensory is intact.  gait is normal.  MUSCULOSKELETAL:     Left upper extremity: Lymphedema throughout the extremity. Nontender about the shoulder and elbow with good range of motion. Negative Tinel's at the cubital tunnel, positive Tinel's at the carpal tunnel, positive Edgard's, very positive tenderness over the first extensor compartment with positive Finkelstein's. Ganglion cyst present over the 1st extensor compartment. Nontender over the A1 pulleys with no active triggering. Negative CMC grind. Full flexion extension of the fingers. Negative Wartenberg's and cross finger testing. APB strength 5 out of 5 with no atrophy. Median, ulnar, radial nerves intact light touch. Brisk capillary refill.     DATA:    CBC:         Lab Results   Component Value Date     WBC 6.0 08/01/2019     RBC 4.59 08/01/2019     HGB 12.6 08/01/2019     HCT 39.9 08/01/2019     MCV 86.9 08/01/2019     MCH 27.5 08/01/2019     MCHC 31.6 08/01/2019     RDW 14.8 08/01/2019      08/01/2019     MPV 10.9 08/01/2019      PT/INR:  No results found for: PROTIME, INR     Radiology Review: X-rays of the left wrist were reviewed from July 23 of 2019.  3 views: AP, lateral, oblique demonstrate no acute fractures or dislocations of the left wrist.  Impression: No acute fractures or dislocations of left wrist.     MRI of the left wrist was reviewed from August 16, 2019. Coronal, sagittal, axial images were reviewed demonstrating fluid surrounding the extensor pollicis brevis tendon and abductor pollicis longus tendon with a linear defect through the extensor pollicis tendon.   Impression: Tenosynovitis involving the extensor pollicis brevis and abductor pollicis longus with partial tear of the extensor pollicis brevis     IMPRESSION:  · Left wrist de Quervain's tenosynovitis  · Left carpal tunnel syndrome     PLAN:  Discussed findings with patient. Discussed conservative and surgical management with patient. Patient like to try a cortisone injection at today's visit along the first extensor compartment. She also would like to schedule surgery we will plan for a left de Quervain's release and a left endoscopic carpal tunnel release. Postoperative course explained to the patient. All questions answered.        I explained the risks, benefits, alternatives and complications of surgery with the patient including but not limited to the risks of infection, possible damage to nerves, vessels, or tendons, stiffness, loss of range of motion, scar sensitivity, wound healing complications, worsening symptoms, possible need for therapy, as well as the possible need further surgery and unanticipated complications. The patient voiced understanding and all questions were answered. The patient elected to proceed with surgical intervention.         I have seen and evaluated the patient and agree with the above assessment and plan on today's visit. I have performed the key components of the history and physical examination with significant findings of left de Quervain's tenosynovitis. And left carpal tunnel syndrome. Plan for an endoscopic carpal tunnel release and de Quervain's release. Injection provided. I concur with the findings and plan as documented.

## 2020-01-09 ENCOUNTER — OFFICE VISIT (OUTPATIENT)
Dept: ORTHOPEDIC SURGERY | Age: 60
End: 2020-01-09

## 2020-01-09 VITALS
HEART RATE: 84 BPM | RESPIRATION RATE: 18 BRPM | DIASTOLIC BLOOD PRESSURE: 74 MMHG | WEIGHT: 229.94 LBS | TEMPERATURE: 97.9 F | HEIGHT: 65 IN | BODY MASS INDEX: 38.31 KG/M2 | SYSTOLIC BLOOD PRESSURE: 136 MMHG

## 2020-01-09 PROCEDURE — 99024 POSTOP FOLLOW-UP VISIT: CPT | Performed by: ORTHOPAEDIC SURGERY

## 2020-01-09 RX ORDER — SULFAMETHOXAZOLE AND TRIMETHOPRIM 800; 160 MG/1; MG/1
1 TABLET ORAL 2 TIMES DAILY
Qty: 20 TABLET | Refills: 0 | Status: SHIPPED | OUTPATIENT
Start: 2020-01-09 | End: 2020-01-19

## 2020-01-09 NOTE — PROGRESS NOTES
HPI: Patient presents today POD #9 s/p left endoscopic carpal tunnel release and left de Quervain's release. Overall she is doing well. She does report pain in the palm. She works in the kitchen at the hospital.    Physical Exam: incisions are clean dry intact with sutures in place. De Quervain's incision with mild hyperemia. No drainage or fluctuance. Full flexion and extension of the fingers. + pillar pain. Otherwise NVI. Assessment: POD #9 s/p left endoscopic carpal tunnel release and left de Quervain's release    Plan:   Sutures removed today in the office. Scar management advised. Activity restrictions reviewed with the patient. HEP and ROM exercises demonstrated to the patient. Patient referred to therapy. Will complete a course of therapy and plan to return to work on February 3 of 2020. Patient placed on Bactrim. Follow up in 1 month if needed. All questions answered. I have seen and evaluated the patient and agree with the above assessment and plan on today's visit. I have performed the key components of the history and physical examination with significant findings of postpo. I concur with the findings and plan as documented.     Cristian Eubanks MD  1/9/2020

## 2020-01-13 ENCOUNTER — EVALUATION (OUTPATIENT)
Dept: OCCUPATIONAL THERAPY | Age: 60
End: 2020-01-13
Payer: COMMERCIAL

## 2020-01-13 PROCEDURE — 97165 OT EVAL LOW COMPLEX 30 MIN: CPT | Performed by: OCCUPATIONAL THERAPIST

## 2020-01-13 PROCEDURE — 97530 THERAPEUTIC ACTIVITIES: CPT | Performed by: OCCUPATIONAL THERAPIST

## 2020-01-13 NOTE — PROGRESS NOTES
of pains in the left radial wrist x 3 years. After having 2 injections, pt continued to have difficulties with pain, swelling and pm numbness/ tingling. On 19, she required a left CTR with Dequervains release. Her main complaints today include pain and  tolerance for using the left wrist/ hand. In addition, she is concerned about RTW (tentative 2-3-20). Comments: Palpation to the thumb and palm is very tender. Pt is currently on antibiotics for infection in the thumb incision. 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 She requires assistance for meal prep and for all tasks that require lifting, pushing or pulling. UE Assessment: LHD    Left UE AROM: Exceptions to WNL  Wrist flexion 0-70  Wrist extension 0-55 with pulling in thumb  RD 0-15  UD 0-40    Thumb radial extension 0-40 with pain  Thumb ABD 0-56    Left UE strength: Exceptions to WNL  Wrist: 3- to 3/5  - 27#    (R is 55#)  Lateral pinch- 7#    (R is 14#)    Sensation: WNL    Coordination: No acute deficits noted    Intervention: MH provided for the affected hand/ wrist x 5 min only due to swelling and infection. Scar massage provided and pt was instructed in technique. Home scar massage encouraged for 4x/ day while avoiding the distal potion of the thumb incison. Pt verbalized and demonstrated understanding. Will continue with current plan of care.      Assessment of retraining/education per  individual diagnosis/goals        GOALS (Long term same as Short term):  1) Patient will demonstrate good understanding of home program(exercises/activities/diagnosis/prognosis/goals) with good accuracy. 2) Patient will demonstrate increased active/passive range of motion of their left wrist and thumb to WNLs for ADL/IADL completion. 3) increase strength in the left wrist to 4/5 in preparation for return to work related tasks. 4) Patient will demonstrate increased /pinch strength of at least 10 / 5 pinch pounds of their left hand. 5) Patient to report decreased pain in their affected left distal  upper extremity from 5/10 to 2/10 or less with resistive functional use. 6) Patient will demonstrate a non-tender/non-adherent scar. 7) Patient will report ADL/ IADL functions same as prior to diagnosis of  L CTS/ L Dequervain's. 8 )Patient will demonstrate improved functional activity tolerance from faair  to good for preparation for RTW. OT Outcomes : quick Dash  Adm: 55%     Patient. Education:  [x] Plans/Goals, Risks/Benefits discussed  [x] Home exercise program  Method of Education: [x] Verbal  [x] Demo  [] Written  Comprehension of Education:  [x] Verbalizes understanding. [x] Demonstrates understanding. [] Needs Review. [] Demonstrates/verbalizes understanding of HEP/Ed previously given. Patient understands diagnosis/prognosis and consents to treatment, plan and goals: [x] Yes    [] No       Electronically signed by: Payton Traore OT/RYLEE  [id number]      [id number] Certification / Comments     Frequency/Duration 3x / week for 10 total visits. Certification period From: 1-13-20  To: 4-13-20    I have reviewed the Plan of Care established for skilled therapy services and certify that the services are required and that they will be provided while the patient is under my care.     Physician's Comments/Revisions:         Physician's Printed Name: Dr Rafael Dodson                                     Physician's Signature:                                                               Date:       Please review Patient's OT evaluation and if you agree sign/date and fax back to us at our  [facility] fax # [phone].

## 2020-01-15 ENCOUNTER — TREATMENT (OUTPATIENT)
Dept: OCCUPATIONAL THERAPY | Age: 60
End: 2020-01-15
Payer: COMMERCIAL

## 2020-01-15 PROBLEM — G56.02 CARPAL TUNNEL SYNDROME OF LEFT WRIST: Status: ACTIVE | Noted: 2020-01-15

## 2020-01-15 PROCEDURE — 97022 WHIRLPOOL THERAPY: CPT | Performed by: OCCUPATIONAL THERAPIST

## 2020-01-15 PROCEDURE — 97110 THERAPEUTIC EXERCISES: CPT | Performed by: OCCUPATIONAL THERAPIST

## 2020-01-15 PROCEDURE — 97140 MANUAL THERAPY 1/> REGIONS: CPT | Performed by: OCCUPATIONAL THERAPIST

## 2020-01-17 ENCOUNTER — TREATMENT (OUTPATIENT)
Dept: OCCUPATIONAL THERAPY | Age: 60
End: 2020-01-17
Payer: COMMERCIAL

## 2020-01-17 PROCEDURE — 97110 THERAPEUTIC EXERCISES: CPT | Performed by: OCCUPATIONAL THERAPIST

## 2020-01-17 PROCEDURE — 97530 THERAPEUTIC ACTIVITIES: CPT | Performed by: OCCUPATIONAL THERAPIST

## 2020-01-17 PROCEDURE — 97140 MANUAL THERAPY 1/> REGIONS: CPT | Performed by: OCCUPATIONAL THERAPIST

## 2020-01-17 NOTE — PROGRESS NOTES
OCCUPATIONAL THERAPY PROGRESS NOTE    Date:  2020  Initial Evaluation Date: 20    Patient Name:  Amelia Cohen    :  1960  Restrictions/Precautions:  ROM as tolerated, Low fall risk  Diagnosis:  Left carpal tunnel syndrome/ L Dequervain's tenosynovitis                                                        Insurance/Certification information:  [employer] [place]  Referring Physician:  Dr Yola Weiss  Date of Surgery/Injury: surgery 19  Plan of care signed (Y/N):  N  Visit# / total visits: 3 / 10 visits    Pain Level: 5 on scale of 1-10,sore, tight (pulling) and uncomfortable    Subjective: \"I was really sore after the last visit and it started to swell again. \"     Objective:  Updated POC to be completed by visit 10. INTERVENTION: COMPLETED: SPECIFICS/COMMENTS:   Modality:     MH x L hand/ wrist pre Tx and ice afterwards x 10 min total   Fluidotherapy L wrist/ hand x 10 min as preconditioning prior to ex   US x scars x 5 min- 3.3MHz, 50% . 6   AROM/ AAROM     Wrist/ Thumb AROM x Completed as tolerated   Tendon glides x Tolerated fair   Median nerve glides          PROM/Stretching:     Gentle PROM wrist/ thumb x         Scar Mass/Edema Control:     Scar massage wrist/ dorsal thumb x Completed lightly   Lymphedema massage x Completed at the wrist   Strengthening:               Other:                 Assessment/Comments: Pt is making Good progress toward stated plan of care. Tx decreased in intensity with better tolerance. Stockinette provided after Tx and area was iced. Pt encouraged to continue ice at home if swelling persists.  Lymphedema massage at the wrist provided to assist. Will advance as tolerated  -Rehab Potential: Good  -Requires OT Follow Up: Yes  Time In:930            Time Out:              Visit #: 3    Treatment Charges: Mins Units   Modalities: FL/US     Ther Exercise 25 2   Manual Therapy 15 1   Thera Activities 10 1   ADL/Home Mgt      Neuro Re-education     Group Therapy     Non-Billable Service Time 10 0   Other     Total Time/Units 60 4       Goals: Goals for pt can be seen on initial eval occurring on 1-13-19    Plan:   [x]  Continue Plan of care: Pt education continues at each visit to obtain maximum benefits from skilled OT intervention.   []  [address] of care:   []  Discharge:      Tram [place] OT/L  [id number]

## 2020-01-20 ENCOUNTER — TREATMENT (OUTPATIENT)
Dept: OCCUPATIONAL THERAPY | Age: 60
End: 2020-01-20
Payer: COMMERCIAL

## 2020-01-20 PROCEDURE — 97530 THERAPEUTIC ACTIVITIES: CPT | Performed by: OCCUPATIONAL THERAPIST

## 2020-01-20 PROCEDURE — 97140 MANUAL THERAPY 1/> REGIONS: CPT | Performed by: OCCUPATIONAL THERAPIST

## 2020-01-20 PROCEDURE — 97110 THERAPEUTIC EXERCISES: CPT | Performed by: OCCUPATIONAL THERAPIST

## 2020-01-20 NOTE — PROGRESS NOTES
OCCUPATIONAL THERAPY PROGRESS NOTE    Date:  2020  Initial Evaluation Date: 20    Patient Name:  Debbie Greenfield    :  1960  Restrictions/Precautions:  ROM as tolerated, Low fall risk  Diagnosis:  Left carpal tunnel syndrome/ L Dequervain's tenosynovitis                                                        Insurance/Certification information:  Medical [place]  Referring Physician:  Dr Joanne Garsia  Date of Surgery/Injury: surgery 19  Plan of care signed (Y/N):  N  Visit# / total visits: 4 / 10 visits    Pain Level: 2-3 on scale of 1-10, tender, sore in the radial wrist an forearm  Subjective: \"I feel pretty good today\". Objective:  Updated POC to be completed by visit 10. INTERVENTION: COMPLETED: SPECIFICS/COMMENTS:   Modality:     MH x L hand/ wrist pre Tx and ice afterwards x 10 min total   Fluidotherapy L wrist/ hand  10 min as preconditioning prior to ex   US x scars x 5 min- 3.3MHz, 50% . 6   AROM/ AAROM     Wrist/ Thumb AROM x Completed as tolerated   Tendon glides x Tolerated fair   Median nerve glides          PROM/Stretching:     Gentle PROM wrist/ thumb x         Scar Mass/Edema Control:     Scar massage wrist/ dorsal thumb x Completed lightly   Lymphedema massage x Completed at the wrist   Strengthening:     Prehension strengthening x xsoft putty with lateral pinch/ discs and tip/ small pegs   Putty ex- xsoft x Taffy pull and rolling with wrist/ digit extension as tolerated        Other:                 Assessment/Comments: Pt is making Good progress toward stated plan of care. Pt denies any paresthesias. Tx continued with an increase in activity. Sensitivity in the radial wrist extension continues with mild swelling. Will advance as tolerated.      -Rehab Potential: Good  -Requires OT Follow Up: Yes  Time In: 905           Time Out: 1005             Visit #: 4    Treatment Charges: Mins Units   Modalities: FL/US 5 0   Ther Exercise 25 2   Manual Therapy 10 1   Thera

## 2020-01-22 ENCOUNTER — TREATMENT (OUTPATIENT)
Dept: OCCUPATIONAL THERAPY | Age: 60
End: 2020-01-22
Payer: COMMERCIAL

## 2020-01-22 PROCEDURE — 97110 THERAPEUTIC EXERCISES: CPT | Performed by: OCCUPATIONAL THERAPIST

## 2020-01-22 PROCEDURE — 97530 THERAPEUTIC ACTIVITIES: CPT | Performed by: OCCUPATIONAL THERAPIST

## 2020-01-22 PROCEDURE — 97140 MANUAL THERAPY 1/> REGIONS: CPT | Performed by: OCCUPATIONAL THERAPIST

## 2020-01-22 NOTE — PROGRESS NOTES
OCCUPATIONAL THERAPY PROGRESS NOTE    Date:  2020  Initial Evaluation Date: 20    Patient Name:  Akiko Rios    :  1960  Restrictions/Precautions:  ROM as tolerated, Low fall risk  Diagnosis:  Left carpal tunnel syndrome/ L Dequervain's tenosynovitis                                                        Insurance/Certification information:  Medical [place]  Referring Physician:  Dr Brittani Rios  Date of Surgery/Injury: surgery 19  Plan of care signed (Y/N):  N  Visit# / total visits: 5 / 10 visits    Pain Level: 1-2 on scale of 1-10, tender, sore in the radial wrist an forearm  Subjective: \"I was sore for a day after therapy but then next day it was OK\". Objective:  Updated POC to be completed by visit 10. INTERVENTION: COMPLETED: SPECIFICS/COMMENTS:   Modality:     MH x L hand/ wrist pre Tx and ice afterwards x 10 min total   Fluidotherapy L wrist/ hand  10 min as preconditioning prior to ex   US x scars HOLD 5 min- 3.3MHz, 50% . 6   AROM/ AAROM     Wrist/ Thumb AROM x Completed as tolerated   Tendon glides x Tolerated fair   wristciser x    Exercise balls x         PROM/Stretching:     Gentle PROM wrist/ thumb x         Scar Mass/Edema Control:     Scar massage wrist/ dorsal thumb x Completed lightly   Lymphedema massage x Completed at the wrist   Strengthening:     Prehension strengthening x xsoft putty with lateral pinch/ discs and tip/ small pegs   Putty ex- soft x Gripping as tolerated x 20, roll and pinch 4x   Wrist PREs 1# 1-20 Flexion, ext, and deviations   Weighted dowel 1# 1-10 Supination/ pronation             Other:                 Assessment/Comments: Pt is making Good progress toward stated plan of care. Activity tolerance is much better today as inflammation is decreasing. No complaints noted re: the CT. However, the radial wrist incision remains the most uncomfortable. Will continue to advance as tolerated.  Pt has questions about how lymphedema can affect her recovery. Will address at next visit. -Rehab Potential: Good  -Requires OT Follow Up: Yes  Time In: 0905           Time Out: 1005             Visit #: 4    Treatment Charges: Mins Units   Modalities: FL/US     Ther Exercise 15 1   Manual Therapy 10 1   Thera Activities 25 2   ADL/Home Mgt      Neuro Re-education     Group Therapy     Non-Billable Service Time 10 0   Other     Total Time/Units 60 4       Goals: Goals for pt can be seen on initial eval occurring on 1-13-19    Plan:   [x]  Continue Plan of care: Pt education continues at each visit to obtain maximum benefits from skilled OT intervention.   []  [facility] of care:   []  Discharge:      Tram [place] OT/L  [id number]

## 2020-01-23 ENCOUNTER — TREATMENT (OUTPATIENT)
Dept: OCCUPATIONAL THERAPY | Age: 60
End: 2020-01-23
Payer: COMMERCIAL

## 2020-01-23 PROCEDURE — 97110 THERAPEUTIC EXERCISES: CPT | Performed by: OCCUPATIONAL THERAPIST

## 2020-01-23 PROCEDURE — 97140 MANUAL THERAPY 1/> REGIONS: CPT | Performed by: OCCUPATIONAL THERAPIST

## 2020-01-23 PROCEDURE — 97530 THERAPEUTIC ACTIVITIES: CPT | Performed by: OCCUPATIONAL THERAPIST

## 2020-01-28 ENCOUNTER — TELEPHONE (OUTPATIENT)
Dept: ORTHOPEDIC SURGERY | Age: 60
End: 2020-01-28

## 2020-01-28 ENCOUNTER — TREATMENT (OUTPATIENT)
Dept: OCCUPATIONAL THERAPY | Age: 60
End: 2020-01-28
Payer: COMMERCIAL

## 2020-01-28 PROCEDURE — 97530 THERAPEUTIC ACTIVITIES: CPT | Performed by: OCCUPATIONAL THERAPIST

## 2020-01-28 PROCEDURE — 97140 MANUAL THERAPY 1/> REGIONS: CPT | Performed by: OCCUPATIONAL THERAPIST

## 2020-01-28 PROCEDURE — 97110 THERAPEUTIC EXERCISES: CPT | Performed by: OCCUPATIONAL THERAPIST

## 2020-01-28 PROCEDURE — 97018 PARAFFIN BATH THERAPY: CPT | Performed by: OCCUPATIONAL THERAPIST

## 2020-01-28 NOTE — PROGRESS NOTES
OCCUPATIONAL THERAPY PROGRESS NOTE    Date:  2020  Initial Evaluation Date: 20    Patient Name:  Kiera Rubin    :  1960  Restrictions/Precautions:  ROM as tolerated, Low fall risk  Diagnosis:  Left carpal tunnel syndrome/ L Dequervain's tenosynovitis                                                        Insurance/Certification information:  Medical [place]  Referring Physician:  Dr Salvatore Gonzales  Date of Surgery/Injury: surgery 19  Plan of care signed (Y/N):  N  Visit# / total visits: 7/ 10 visits    Pain Level: 2 on scale of 1-10, tender, sore in the radial wrist an forearm  Subjective: \" My wrist is getting better but it is still a little sore\". Objective:  Updated POC to be completed by visit 10. INTERVENTION: COMPLETED: SPECIFICS/COMMENTS:   Modality:     MH x L hand/ wrist pre Tx and ice afterwards x 10 min total   Paraffin tx L wrist/ hand  10 min as preconditioning prior to ex   US x scars HOLD 5 min- 3.3MHz, 50% . 6   AROM/ AAROM     Wrist/ Thumb AROM x Completed as tolerated   Tendon glides x Tolerated fair   wristciser x    Exercise balls x         PROM/Stretching:     Gentle PROM wrist/ thumb x         Scar Mass/Edema Control:     Scar massage wrist/ dorsal thumb x Completed lightly   Lymphedema massage  Completed at the wrist   Strengthening:     Prehension strengthening  xsoft putty with lateral pinch/ discs and tip/ small pegs   Putty ex- soft x Gripping as tolerated x 20, roll and pinch 4x   Wrist PREs 2# 1-20 Flexion, ext, and deviations   Weighted dowel 1# 1-10 Supination/ pronation   Putty ex- x soft x Hook, large lid, key- tolerated well        Other:                 Assessment/Comments: Pt is making Good progress toward stated plan of care. Pt presents with a firm suture protruding from the base of the dorsal thumb incision. Attempts to remove it was not successful. Pt is planning to follow up with her MD office for management.  Tolerance for activity continues to improve.      -Rehab Potential: Good  -Requires OT Follow Up: Yes  Time In: 0900           Time Out: 1000          Visit # 7    Treatment Charges: Mins Units   Modalities: Paraffin/US 10/0 1   Ther Exercise 20 1   Manual Therapy 10 1   Thera Activities 20 1   ADL/Home Mgt      Neuro Re-education     Group Therapy     Non-Billable Service Time     Other     Total Time/Units 60 4       Goals: Goals for pt can be seen on initial eval occurring on 1-13-19    Plan:   [x]  Continue Plan of care: Pt education continues at each visit to obtain maximum benefits from skilled OT intervention.   []  [facility] of care:   []  Discharge:      Twila Donaldson OT/L  [id number]

## 2020-01-29 ENCOUNTER — CLINICAL DOCUMENTATION (OUTPATIENT)
Dept: ORTHOPEDIC SURGERY | Age: 60
End: 2020-01-29

## 2020-01-29 RX ORDER — SULFAMETHOXAZOLE AND TRIMETHOPRIM 800; 160 MG/1; MG/1
1 TABLET ORAL 2 TIMES DAILY
Qty: 20 TABLET | Refills: 0 | Status: SHIPPED | OUTPATIENT
Start: 2020-01-29 | End: 2020-02-08

## 2020-01-30 ENCOUNTER — OFFICE VISIT (OUTPATIENT)
Dept: ORTHOPEDIC SURGERY | Age: 60
End: 2020-01-30

## 2020-01-30 VITALS — HEART RATE: 88 BPM | DIASTOLIC BLOOD PRESSURE: 80 MMHG | SYSTOLIC BLOOD PRESSURE: 137 MMHG | RESPIRATION RATE: 16 BRPM

## 2020-01-30 PROCEDURE — 99024 POSTOP FOLLOW-UP VISIT: CPT | Performed by: ORTHOPAEDIC SURGERY

## 2020-01-31 ENCOUNTER — TREATMENT (OUTPATIENT)
Dept: OCCUPATIONAL THERAPY | Age: 60
End: 2020-01-31
Payer: COMMERCIAL

## 2020-01-31 PROCEDURE — 97530 THERAPEUTIC ACTIVITIES: CPT | Performed by: OCCUPATIONAL THERAPIST

## 2020-01-31 PROCEDURE — 97140 MANUAL THERAPY 1/> REGIONS: CPT | Performed by: OCCUPATIONAL THERAPIST

## 2020-01-31 PROCEDURE — 97110 THERAPEUTIC EXERCISES: CPT | Performed by: OCCUPATIONAL THERAPIST

## 2020-03-20 RX ORDER — FLECAINIDE ACETATE 100 MG/1
100 TABLET ORAL 2 TIMES DAILY
Qty: 180 TABLET | Refills: 1 | Status: SHIPPED
Start: 2020-03-20 | End: 2020-10-19 | Stop reason: SDUPTHER

## 2020-03-20 RX ORDER — ATENOLOL 25 MG/1
TABLET ORAL
Qty: 180 TABLET | Refills: 1 | Status: SHIPPED
Start: 2020-03-20 | End: 2020-10-19 | Stop reason: SDUPTHER

## 2020-04-29 ENCOUNTER — PATIENT MESSAGE (OUTPATIENT)
Dept: PHARMACY | Facility: CLINIC | Age: 60
End: 2020-04-29

## 2020-04-29 ENCOUNTER — TELEPHONE (OUTPATIENT)
Dept: PHARMACY | Facility: CLINIC | Age: 60
End: 2020-04-29

## 2020-04-29 NOTE — TELEPHONE ENCOUNTER
Pharmacy Pop Care Documentation:   2020 Annual Pharmacy  Visit    Called patient to complete yearly pharmacy appointment to discuss the 2020 Diabetes Management Program.    No answer. Left VM on home/cell TAD: Please call back at [phone] Option #7. [employer] message also sent.     Queen Ember, [address]   Department, toll free: [phone], option 7

## 2020-05-06 NOTE — TELEPHONE ENCOUNTER
Second attempt made to contact patient to complete 2020 Annual Pharmacy  Visit for the DM Program.    No answer. Left VM on home/cell TAD: Please call back at [phone] Option #7. Previous [employer] message not read by patient. Letter sent to patient.     Queen Ember, [address] [employer] [facility]   Department, toll free: [phone], option [address] [facility] Diabetes Management Program

## 2020-05-19 ENCOUNTER — TELEPHONE (OUTPATIENT)
Dept: PHARMACY | Facility: CLINIC | Age: 60
End: 2020-05-19

## 2020-05-19 ENCOUNTER — PATIENT MESSAGE (OUTPATIENT)
Dept: PHARMACY | Facility: CLINIC | Age: 60
End: 2020-05-19

## 2020-05-19 NOTE — LETTER
10. Medication adherence over 70%. Patients who fall below 70% will be contacted by a pharmacist in the program to discuss any issues. If you have a provider outside of the [facility] system - a provider that does not use the [facility] electronic chart system - or if you have your testing - lab/urine - done at a facility other than [facility] you will have to email/fax this information to us so that we may document it into your [facility] Records. Thank you,   [address] [facility] Team   Telephone: [phone], Option #7   Fax: [phone]   Email: [email or web address]

## 2020-06-04 ENCOUNTER — HOSPITAL ENCOUNTER (OUTPATIENT)
Age: 60
Discharge: HOME | End: 2020-06-06
Payer: COMMERCIAL

## 2020-06-04 LAB
ALBUMIN SERPL-MCNC: 4.5 G/DL (ref 3.5–5.2)
ALP BLD-CCNC: 67 U/L (ref 35–104)
ALT SERPL-CCNC: 26 U/L (ref 0–32)
ANION GAP SERPL CALCULATED.3IONS-SCNC: 15 MMOL/L (ref 7–16)
AST SERPL-CCNC: 24 U/L (ref 0–31)
BACTERIA: ABNORMAL /HPF
BASOPHILS ABSOLUTE: 0.06 E9/L (ref 0–0.2)
BASOPHILS RELATIVE PERCENT: 1 % (ref 0–2)
BILIRUB SERPL-MCNC: 0.4 MG/DL (ref 0–1.2)
BUN BLDV-MCNC: 12 MG/DL (ref 8–23)
C-REACTIVE PROTEIN, HIGH SENSITIVITY: 4.1 MG/L (ref 0–3)
CALCIUM SERPL-MCNC: 10.1 MG/DL (ref 8.6–10.2)
CHLORIDE BLD-SCNC: 102 MMOL/L (ref 98–107)
CHOLESTEROL, TOTAL: 168 MG/DL (ref 0–199)
CO2: 27 MMOL/L (ref 22–29)
CREAT SERPL-MCNC: 0.7 MG/DL (ref 0.5–1)
EOSINOPHILS ABSOLUTE: 0.29 E9/L (ref 0.05–0.5)
EOSINOPHILS RELATIVE PERCENT: 4.9 % (ref 0–6)
EPITHELIAL CELLS, UA: ABNORMAL /HPF
GFR AFRICAN AMERICAN: >60
GFR NON-AFRICAN AMERICAN: >60 ML/MIN/1.73
GLUCOSE BLD-MCNC: 130 MG/DL (ref 74–99)
HBA1C MFR BLD: 6.2 % (ref 4–5.6)
HCT VFR BLD CALC: 40.1 % (ref 34–48)
HDLC SERPL-MCNC: 39 MG/DL
HEMOGLOBIN: 12 G/DL (ref 11.5–15.5)
IMMATURE GRANULOCYTES #: 0.02 E9/L
IMMATURE GRANULOCYTES %: 0.3 % (ref 0–5)
LDL CHOLESTEROL CALCULATED: 93 MG/DL (ref 0–99)
LYMPHOCYTES ABSOLUTE: 1.18 E9/L (ref 1.5–4)
LYMPHOCYTES RELATIVE PERCENT: 20.1 % (ref 20–42)
MAGNESIUM: 1.5 MG/DL (ref 1.6–2.6)
MCH RBC QN AUTO: 26.5 PG (ref 26–35)
MCHC RBC AUTO-ENTMCNC: 29.9 % (ref 32–34.5)
MCV RBC AUTO: 88.5 FL (ref 80–99.9)
MICROALBUMIN UR-MCNC: <12 MG/L
MONOCYTES ABSOLUTE: 0.37 E9/L (ref 0.1–0.95)
MONOCYTES RELATIVE PERCENT: 6.3 % (ref 2–12)
NEUTROPHILS ABSOLUTE: 3.95 E9/L (ref 1.8–7.3)
NEUTROPHILS RELATIVE PERCENT: 67.4 % (ref 43–80)
PDW BLD-RTO: 14.9 FL (ref 11.5–15)
PHOSPHORUS: 3.9 MG/DL (ref 2.5–4.5)
PLATELET # BLD: 201 E9/L (ref 130–450)
PMV BLD AUTO: 10.9 FL (ref 7–12)
POTASSIUM SERPL-SCNC: 4.2 MMOL/L (ref 3.5–5)
RBC # BLD: 4.53 E12/L (ref 3.5–5.5)
RBC UA: ABNORMAL /HPF (ref 0–2)
SEDIMENTATION RATE, ERYTHROCYTE: 23 MM/HR (ref 0–20)
SODIUM BLD-SCNC: 144 MMOL/L (ref 132–146)
TOTAL PROTEIN: 7.4 G/DL (ref 6.4–8.3)
TRIGL SERPL-MCNC: 180 MG/DL (ref 0–149)
TSH SERPL DL<=0.05 MIU/L-ACNC: 1.58 UIU/ML (ref 0.27–4.2)
URIC ACID, SERUM: 7.6 MG/DL (ref 2.4–5.7)
VITAMIN D 25-HYDROXY: 30 NG/ML (ref 30–100)
VLDLC SERPL CALC-MCNC: 36 MG/DL
WBC # BLD: 5.9 E9/L (ref 4.5–11.5)
WBC UA: ABNORMAL /HPF (ref 0–5)

## 2020-06-04 PROCEDURE — 86141 C-REACTIVE PROTEIN HS: CPT

## 2020-06-04 PROCEDURE — 80053 COMPREHEN METABOLIC PANEL: CPT

## 2020-06-04 PROCEDURE — 84443 ASSAY THYROID STIM HORMONE: CPT

## 2020-06-04 PROCEDURE — 83036 HEMOGLOBIN GLYCOSYLATED A1C: CPT

## 2020-06-04 PROCEDURE — 80061 LIPID PANEL: CPT

## 2020-06-04 PROCEDURE — 84550 ASSAY OF BLOOD/URIC ACID: CPT

## 2020-06-04 PROCEDURE — 84100 ASSAY OF PHOSPHORUS: CPT

## 2020-06-04 PROCEDURE — 81015 MICROSCOPIC EXAM OF URINE: CPT

## 2020-06-04 PROCEDURE — 85025 COMPLETE CBC W/AUTO DIFF WBC: CPT

## 2020-06-04 PROCEDURE — 85651 RBC SED RATE NONAUTOMATED: CPT

## 2020-06-04 PROCEDURE — 83735 ASSAY OF MAGNESIUM: CPT

## 2020-06-04 PROCEDURE — 82306 VITAMIN D 25 HYDROXY: CPT

## 2020-06-04 PROCEDURE — 82044 UR ALBUMIN SEMIQUANTITATIVE: CPT

## 2020-06-17 ENCOUNTER — HOSPITAL ENCOUNTER (OUTPATIENT)
Age: 60
Discharge: HOME | End: 2020-06-19
Payer: COMMERCIAL

## 2020-06-17 PROCEDURE — 88175 CYTOPATH C/V AUTO FLUID REDO: CPT

## 2020-07-14 ENCOUNTER — HOSPITAL ENCOUNTER (OUTPATIENT)
Age: 60
Discharge: HOME | End: 2020-07-16
Payer: COMMERCIAL

## 2020-07-14 LAB
FOLATE: 15.4 NG/ML (ref 4.8–24.2)
VITAMIN B-12: 184 PG/ML (ref 211–946)

## 2020-07-14 PROCEDURE — 82746 ASSAY OF FOLIC ACID SERUM: CPT

## 2020-07-14 PROCEDURE — 82607 VITAMIN B-12: CPT

## 2020-07-21 ENCOUNTER — HOSPITAL ENCOUNTER (OUTPATIENT)
Age: 60
Discharge: HOME | End: 2020-07-23
Payer: COMMERCIAL

## 2020-07-21 PROCEDURE — 87077 CULTURE AEROBIC IDENTIFY: CPT

## 2020-07-21 PROCEDURE — 87088 URINE BACTERIA CULTURE: CPT

## 2020-07-21 PROCEDURE — 87186 SC STD MICRODIL/AGAR DIL: CPT

## 2020-07-23 LAB
ORGANISM: ABNORMAL
URINE CULTURE, ROUTINE: ABNORMAL

## 2020-07-28 ENCOUNTER — HOSPITAL ENCOUNTER (OUTPATIENT)
Age: 60
Discharge: HOME | End: 2020-07-30
Payer: COMMERCIAL

## 2020-07-28 PROCEDURE — 88305 TISSUE EXAM BY PATHOLOGIST: CPT

## 2020-08-18 ENCOUNTER — HOSPITAL ENCOUNTER (OUTPATIENT)
Age: 60
Discharge: HOME | End: 2020-08-20
Payer: COMMERCIAL

## 2020-08-18 LAB
ALBUMIN SERPL-MCNC: 4.3 G/DL (ref 3.5–5.2)
ALP BLD-CCNC: 64 U/L (ref 35–104)
ALT SERPL-CCNC: 22 U/L (ref 0–32)
ANION GAP SERPL CALCULATED.3IONS-SCNC: 19 MMOL/L (ref 7–16)
AST SERPL-CCNC: 22 U/L (ref 0–31)
BASOPHILS ABSOLUTE: 0.04 E9/L (ref 0–0.2)
BASOPHILS RELATIVE PERCENT: 0.6 % (ref 0–2)
BILIRUB SERPL-MCNC: 0.3 MG/DL (ref 0–1.2)
BUN BLDV-MCNC: 14 MG/DL (ref 8–23)
CALCIUM SERPL-MCNC: 10.2 MG/DL (ref 8.6–10.2)
CHLORIDE BLD-SCNC: 103 MMOL/L (ref 98–107)
CO2: 22 MMOL/L (ref 22–29)
CREAT SERPL-MCNC: 0.7 MG/DL (ref 0.5–1)
EOSINOPHILS ABSOLUTE: 0.37 E9/L (ref 0.05–0.5)
EOSINOPHILS RELATIVE PERCENT: 5.5 % (ref 0–6)
GFR AFRICAN AMERICAN: >60
GFR NON-AFRICAN AMERICAN: >60 ML/MIN/1.73
GLUCOSE BLD-MCNC: 116 MG/DL (ref 74–99)
HCT VFR BLD CALC: 39.4 % (ref 34–48)
HEMOGLOBIN: 12 G/DL (ref 11.5–15.5)
IMMATURE GRANULOCYTES #: 0.02 E9/L
IMMATURE GRANULOCYTES %: 0.3 % (ref 0–5)
LYMPHOCYTES ABSOLUTE: 1.3 E9/L (ref 1.5–4)
LYMPHOCYTES RELATIVE PERCENT: 19.3 % (ref 20–42)
MCH RBC QN AUTO: 26.7 PG (ref 26–35)
MCHC RBC AUTO-ENTMCNC: 30.5 % (ref 32–34.5)
MCV RBC AUTO: 87.6 FL (ref 80–99.9)
MONOCYTES ABSOLUTE: 0.41 E9/L (ref 0.1–0.95)
MONOCYTES RELATIVE PERCENT: 6.1 % (ref 2–12)
NEUTROPHILS ABSOLUTE: 4.6 E9/L (ref 1.8–7.3)
NEUTROPHILS RELATIVE PERCENT: 68.2 % (ref 43–80)
PDW BLD-RTO: 14.6 FL (ref 11.5–15)
PLATELET # BLD: 185 E9/L (ref 130–450)
PMV BLD AUTO: 11 FL (ref 7–12)
POTASSIUM SERPL-SCNC: 4.1 MMOL/L (ref 3.5–5)
RBC # BLD: 4.5 E12/L (ref 3.5–5.5)
SODIUM BLD-SCNC: 144 MMOL/L (ref 132–146)
TOTAL PROTEIN: 7.2 G/DL (ref 6.4–8.3)
WBC # BLD: 6.7 E9/L (ref 4.5–11.5)

## 2020-08-18 PROCEDURE — 86300 IMMUNOASSAY TUMOR CA 15-3: CPT

## 2020-08-18 PROCEDURE — 80053 COMPREHEN METABOLIC PANEL: CPT

## 2020-08-18 PROCEDURE — 36415 COLL VENOUS BLD VENIPUNCTURE: CPT

## 2020-08-18 PROCEDURE — 85025 COMPLETE CBC W/AUTO DIFF WBC: CPT

## 2020-08-21 LAB — CA 27.29: 11.7 U/ML (ref 0–40)

## 2020-10-19 ENCOUNTER — OFFICE VISIT (OUTPATIENT)
Dept: CARDIOLOGY CLINIC | Age: 60
End: 2020-10-19
Payer: COMMERCIAL

## 2020-10-19 VITALS
DIASTOLIC BLOOD PRESSURE: 70 MMHG | BODY MASS INDEX: 38.65 KG/M2 | SYSTOLIC BLOOD PRESSURE: 128 MMHG | WEIGHT: 232 LBS | HEART RATE: 86 BPM | HEIGHT: 65 IN

## 2020-10-19 PROCEDURE — 99214 OFFICE O/P EST MOD 30 MIN: CPT | Performed by: INTERNAL MEDICINE

## 2020-10-19 PROCEDURE — 93000 ELECTROCARDIOGRAM COMPLETE: CPT | Performed by: INTERNAL MEDICINE

## 2020-10-19 RX ORDER — ENALAPRIL MALEATE 2.5 MG/1
2.5 TABLET ORAL NIGHTLY
Qty: 90 TABLET | Refills: 3 | Status: SHIPPED | OUTPATIENT
Start: 2020-10-19 | End: 2020-10-26 | Stop reason: SDUPTHER

## 2020-10-19 RX ORDER — ATENOLOL 25 MG/1
TABLET ORAL
Qty: 180 TABLET | Refills: 3 | Status: SHIPPED
Start: 2020-10-19 | End: 2021-10-26 | Stop reason: SDUPTHER

## 2020-10-19 RX ORDER — FLECAINIDE ACETATE 100 MG/1
100 TABLET ORAL 2 TIMES DAILY
Qty: 180 TABLET | Refills: 3 | Status: SHIPPED
Start: 2020-10-19 | End: 2020-12-23 | Stop reason: DRUGHIGH

## 2020-10-19 NOTE — PROGRESS NOTES
OFFICE VISIT     PRIMARY CARE PHYSICIAN:      Darline Rothman DO       ALLERGIES / SENSITIVITIES:      No Known Allergies       REVIEWED MEDICATIONS:        Current Outpatient Medications:     atenolol (TENORMIN) 25 MG tablet, 25 mg TWICE daily, Disp: 180 tablet, Rfl: 3    flecainide (TAMBOCOR) 100 MG tablet, Take 1 tablet by mouth 2 times daily, Disp: 180 tablet, Rfl: 3    enalapril (VASOTEC) 2.5 MG tablet, Take 1 tablet by mouth nightly, Disp: 90 tablet, Rfl: 3    Fluticasone furoate-vilanterol (BREO ELLIPTA) 200-25 MCG/INH AEPB inhaler, One puff daily inhalation- dispense 3(3 months supply), Disp: 3 each, Rfl: 1    cyanocobalamin 1000 MCG/ML injection, Inject 1 mL into the muscle every 30 days injfect 1cc weekly for 30 days followed by once monthly thereafter, Disp: 3 vial, Rfl: 3    simvastatin (ZOCOR) 40 MG tablet, Take 1 tablet by mouth nightly, Disp: 90 tablet, Rfl: 1    lansoprazole (PREVACID) 30 MG delayed release capsule, Take 1 capsule by mouth daily, Disp: 90 capsule, Rfl: 1    fluticasone (VERAMYST) 27.5 MCG/SPRAY nasal spray, 1 spray by Nasal route 2 times daily, Disp: 3 Bottle, Rfl: 1    allopurinol (ZYLOPRIM) 100 MG tablet, Take 1 tablet by mouth daily, Disp: 90 tablet, Rfl: 1    metFORMIN (GLUCOPHAGE) 1000 MG tablet, TAKE ONE TABLET BY MOUTH TWICE A DAY WITH MEALS, Disp: 180 tablet, Rfl: 2    vitamin D (ERGOCALCIFEROL) 1.25 MG (53861 UT) CAPS capsule, TAKE 1 CAPSULE BY MOUTH ONE TIME WEEKLY, Disp: 12 capsule, Rfl: 2    azelastine (ASTELIN) 0.1 % nasal spray, USE 2 SPRAYS INTO EACH NOSTRIL 2 TIMES DAILY AS DIRECTED, Disp: 120 mL, Rfl: 0    hydroCHLOROthiazide (HYDRODIURIL) 25 MG tablet, Take 1 tablet by mouth daily, Disp: 90 tablet, Rfl: 1    sertraline (ZOLOFT) 50 MG tablet, Take 1 tablet by mouth daily, Disp: 90 tablet, Rfl: 1    gabapentin (NEURONTIN) 300 MG capsule, Take 1 capsule by mouth 2 times daily for 180 days.  Intended supply: 90 days, Disp: 180 capsule, Rfl: 1    LANTUS SOLOSTAR 100 UNIT/ML injection pen, INJECT 20 UNITS INTO THE SKIN DAILY, Disp: 15 mL, Rfl: 3    aspirin 81 MG chewable tablet, Take 81 mg by mouth daily To check with Dr. Nakia De La Paz office, Disp: , Rfl:     Liraglutide (VICTOZA) 18 MG/3ML SOPN SC injection, Inject 1.8 mg into the skin daily, Disp: 27 mL, Rfl: 3    celecoxib (CELEBREX) 100 MG capsule, Take 1 capsule by mouth 2 times daily, Disp: 60 capsule, Rfl: 2    colchicine (COLCRYS) 0.6 MG tablet, Take two tablets daily for two days then once daily for one week., Disp: 11 tablet, Rfl: 2    Insulin Pen Needle (BD PEN NEEDLE FLOYD U/F) 32G X 4 MM MISC, USE AS DIRECTED, Disp: 100 each, Rfl: 5    pyridoxine (RA VITAMIN B-6) 50 MG tablet, Take 2 tablets by mouth daily (Patient taking differently: Take 100 mg by mouth daily LD 12-26-19), Disp: 90 tablet, Rfl: 3    albuterol sulfate HFA (PROAIR HFA) 108 (90 Base) MCG/ACT inhaler, Inhale 2 puffs into the lungs every 4 hours as needed for Wheezing or Shortness of Breath (Patient taking differently: Inhale 2 puffs into the lungs every 4 hours as needed for Wheezing or Shortness of Breath Instructed to take am of procedure and bring), Disp: 2 Inhaler, Rfl: 3      S: REASON FOR VISIT:       Chief Complaint   Patient presents with    Irregular Heart Beat     Annual.  Patient denies any cp sob or dizziness. History of Present Illness:       Office Visit for follow up of SVT, RVOT V Tach   63 yr pt with HX of SVT, AVNRT, s/p ablation came for f/u visit   Saw  Corey Otto -Dr Ayala Later June 2019, Told to decrease Flecainide to 50 BID, she continue to  Take 100 BID. No hospitalizations or surgeries since last visit   Patient is compliant with all medications   Nj any exertional chest pain or short of breath   No palpitations, dizzy or syncope.    Active at home   No orthopnea   Try to watch diet          Past Medical History:   Diagnosis Date    A-fib [facility])     Dr. Zazueta Host Atrial fibrillation [facility])     [facility] Stable  -     EKG 12 Lead    AVNRT (AV mihir re-entry tachycardia) ([facility]): S/P Ablation 3/2006-Dr Paladino - Follows with Nate Lim -Dr Rene Rosado     RVOT ventricular tachycardia [facility]): s/p Ablation 6/2006-Dr Paladino; EP study in 2009 -64 Kelli Kumar inducible-no repeat ablation. Follows with Nate Lim -Dr Rene Rosado     Type 2 diabetes mellitus without complication, with long-term current use of insulin ([facility]      Non Morbid obesity due to excess calories (HCC): Diet, exercise and weight loss discussed. De Quervain's disease (radial styloid tenosynovitis)    Preventive Cardiology: Low cholesterol diet, regular exercise as tolerate, and gradual weight loss discussed. Other orders  -     enalapril (VASOTEC) 2.5 MG tablet; Take 1 tablet by mouth nightly  -     atenolol (TENORMIN) 25 MG tablet; 25 mg TWICE daily  -     flecainide (TAMBOCOR) 100 MG tablet; Take 1 tablet by mouth 2 times daily     Monitor BP and heart rates. Above recommendations discussed with her. All questions answered about cardiac diagnoses and cardiac medications. Continue current medications. Compliance with medications and f/u with all physicians discussed. Risk factor modification based on risk profile discussed. Call if any exertional chest pain, short of breath, dizzy or palpitations   Follow up in 9-12 months or earlier if needed.          [facility]  [address], [place], [address] [place] [address]  [phone]

## 2020-12-23 DIAGNOSIS — Z79.4 TYPE 2 DIABETES MELLITUS WITHOUT COMPLICATION, WITH LONG-TERM CURRENT USE OF INSULIN (HCC): ICD-10-CM

## 2020-12-23 DIAGNOSIS — E11.9 TYPE 2 DIABETES MELLITUS WITHOUT COMPLICATION, WITH LONG-TERM CURRENT USE OF INSULIN (HCC): ICD-10-CM

## 2020-12-23 DIAGNOSIS — E78.2 MIXED HYPERLIPIDEMIA: ICD-10-CM

## 2020-12-23 LAB
ALBUMIN SERPL-MCNC: 4.4 G/DL (ref 3.5–5.2)
ALP BLD-CCNC: 68 U/L (ref 35–104)
ALT SERPL-CCNC: 16 U/L (ref 0–32)
ANION GAP SERPL CALCULATED.3IONS-SCNC: 13 MMOL/L (ref 7–16)
AST SERPL-CCNC: 17 U/L (ref 0–31)
BASOPHILS ABSOLUTE: 0.05 E9/L (ref 0–0.2)
BASOPHILS RELATIVE PERCENT: 0.9 % (ref 0–2)
BILIRUB SERPL-MCNC: 0.4 MG/DL (ref 0–1.2)
BUN BLDV-MCNC: 12 MG/DL (ref 8–23)
CALCIUM SERPL-MCNC: 10 MG/DL (ref 8.6–10.2)
CHLORIDE BLD-SCNC: 101 MMOL/L (ref 98–107)
CHOLESTEROL, TOTAL: 169 MG/DL (ref 0–199)
CO2: 27 MMOL/L (ref 22–29)
CREAT SERPL-MCNC: 0.7 MG/DL (ref 0.5–1)
EOSINOPHILS ABSOLUTE: 0.27 E9/L (ref 0.05–0.5)
EOSINOPHILS RELATIVE PERCENT: 4.6 % (ref 0–6)
FOLATE: 11 NG/ML (ref 4.8–24.2)
GFR AFRICAN AMERICAN: >60
GFR NON-AFRICAN AMERICAN: >60 ML/MIN/1.73
GLUCOSE BLD-MCNC: 100 MG/DL (ref 74–99)
HBA1C MFR BLD: 6.1 % (ref 4–5.6)
HCT VFR BLD CALC: 38.4 % (ref 34–48)
HDLC SERPL-MCNC: 41 MG/DL
HEMOGLOBIN: 12.1 G/DL (ref 11.5–15.5)
IMMATURE GRANULOCYTES #: 0.02 E9/L
IMMATURE GRANULOCYTES %: 0.3 % (ref 0–5)
LDL CHOLESTEROL CALCULATED: 88 MG/DL (ref 0–99)
LYMPHOCYTES ABSOLUTE: 1.26 E9/L (ref 1.5–4)
LYMPHOCYTES RELATIVE PERCENT: 21.5 % (ref 20–42)
MCH RBC QN AUTO: 27.3 PG (ref 26–35)
MCHC RBC AUTO-ENTMCNC: 31.5 % (ref 32–34.5)
MCV RBC AUTO: 86.7 FL (ref 80–99.9)
MONOCYTES ABSOLUTE: 0.37 E9/L (ref 0.1–0.95)
MONOCYTES RELATIVE PERCENT: 6.3 % (ref 2–12)
NEUTROPHILS ABSOLUTE: 3.89 E9/L (ref 1.8–7.3)
NEUTROPHILS RELATIVE PERCENT: 66.4 % (ref 43–80)
PDW BLD-RTO: 14.6 FL (ref 11.5–15)
PLATELET # BLD: 215 E9/L (ref 130–450)
PMV BLD AUTO: 11.1 FL (ref 7–12)
POTASSIUM SERPL-SCNC: 4.4 MMOL/L (ref 3.5–5)
RBC # BLD: 4.43 E12/L (ref 3.5–5.5)
SEDIMENTATION RATE, ERYTHROCYTE: 30 MM/HR (ref 0–20)
SODIUM BLD-SCNC: 141 MMOL/L (ref 132–146)
TOTAL PROTEIN: 7.5 G/DL (ref 6.4–8.3)
TRIGL SERPL-MCNC: 201 MG/DL (ref 0–149)
VITAMIN B-12: 771 PG/ML (ref 211–946)
VLDLC SERPL CALC-MCNC: 40 MG/DL
WBC # BLD: 5.9 E9/L (ref 4.5–11.5)

## 2021-01-19 ENCOUNTER — TELEPHONE (OUTPATIENT)
Dept: PHARMACY | Facility: CLINIC | Age: 61
End: 2021-01-19

## 2021-01-19 NOTE — TELEPHONE ENCOUNTER
Pharmacy Pop Care Documentation:   2021 Annual Pharmacy  Visit     Called patient to complete yearly pharmacy appointment to discuss the 2021 Diabetes Management Program.     No answer. Left VM on home/cell TAD: Please call back at [phone] Option #7.      Audrey Reasons, [address]   Department, toll free: [phone], option 7

## 2021-01-28 ENCOUNTER — PATIENT MESSAGE (OUTPATIENT)
Dept: PHARMACY | Facility: CLINIC | Age: 61
End: 2021-01-28

## 2021-01-28 NOTE — LETTER
Lin Cristobal  [address], [address]  Phone: toll free [phone] option 7        [place] [id number]           02/15/21     Dear Marito Rushing! You have completed the 2020 requirements for the [facility] Diabetes Program - Be Well With Diabetes. You have been automatically re-enrolled into this Program for 2021. One of the requirements to participate in the [facility] Diabetes Program - Be Well With Diabetes is to complete a yearly pharmacy telephone appointment. The [facility] Team has attempted to contact you to complete your 2021 Diabetes Management telephone appointment but was unable to reach you. Please call [phone] and select option #7 to speak to us.  Telephone appointments are available Monday thru Friday from 7:30 AM till 5:30 PM.       Sincerely,       [facility] [address]   Phone: [phone], option 7

## 2021-01-28 NOTE — TELEPHONE ENCOUNTER
Second attempt made to contact patient to complete 2021 Annual Pharmacy  Visit for the DM Program.  No answer. Left VM on home/cell TAD: Please call back at [phone] Option #7. [employer] message sent to patient.      Carlos Mcleod, [address]   Department, toll free: [phone], option 7

## 2021-02-15 NOTE — TELEPHONE ENCOUNTER
[employer] message not read by patient. Letter mailed.     Early [place]ates, [address]   Department, toll free: [phone], option 7

## 2021-03-08 ENCOUNTER — TELEPHONE (OUTPATIENT)
Dept: PHARMACY | Facility: CLINIC | Age: 61
End: 2021-03-08

## 2021-03-08 NOTE — TELEPHONE ENCOUNTER
Pharmacy Pop Care Documentation:   2021 Annual Pharmacy  Visit: 2nd Attempt    Called patient to complete yearly pharmacy appointment to discuss the 2021 Diabetes Management Program.     No answer. Left VM on home/cell TAD: Please call back at [phone] Option #7.      Kait Ferrer, [address]   Department, toll free: [phone], option 7

## 2021-03-11 NOTE — TELEPHONE ENCOUNTER
[facility] Employee Diabetes Program - Be Well With Diabetes  =================================================================  Russ Smith is a 63 y.o. female enrolled in the [address] [place] [address] with patient Jean Salguero for yearly visit to discuss enrollment in program. Patient provided Татьяна Estevez with verbal consent to remain in the program for this year. Program Requirements to be completed in 2021:  1. Two office visits in 2021 for diabetes (1st must be completed by 7/1/2021)  2. Two A1c levels in 2021 (1st must be completed by 7/1/2021)  3. Yearly lipid panel  4. Yearly urine microalbumin test  5. Diabetes education if A1c is over 8%  6. Taking an ACE/ARB medication if appropriate  7. Taking a statin medication if appropriate  8. Pneumonia vaccine up to date  5. Yearly flu shot (for 1142-4687 season)  10. Medication adherence over 70%. Patients who fall below 70% will be contacted by a pharmacist in the program to discuss any issues. Are you currently using [address] (home delivery pharmacy) to get your prescriptions? Yes    Would you like to speak with a pharmacist about the program, your diabetes, and/or your prescriptions? No    [facility] Team  Telephone [phone] Option #7   Fax [phone]  Email: [email or web address]    For Pharmacy Admin Tracking Only    PHSO: Yes  Recommended intervention potential cost savings: 4  Time Spent (min): 15    Taking Enalapril 2.5mg, Victoza 3pak, Simvastatin 40mg, Metformin 1000mg

## 2021-06-21 ENCOUNTER — TELEPHONE (OUTPATIENT)
Dept: PHARMACY | Facility: CLINIC | Age: 61
End: 2021-06-21

## 2021-06-21 NOTE — TELEPHONE ENCOUNTER
Pharmacy Pop Care Documentation:   Called patient with reminder for requirements for Diabetes Management Program.     According to our records, patient is missing the following requirement(s) that must be completed by July 1st 2021:     · 1st 2021 A1C       Patient not available at the time of call. Left message on mobile TAD with the above information. [employer] message sent.         Ximena Gonzalez [address]   Department, toll free: [phone], option 7

## 2021-06-22 DIAGNOSIS — E11.9 TYPE 2 DIABETES MELLITUS WITHOUT COMPLICATION, WITH LONG-TERM CURRENT USE OF INSULIN (HCC): ICD-10-CM

## 2021-06-22 DIAGNOSIS — Z79.4 TYPE 2 DIABETES MELLITUS WITHOUT COMPLICATION, WITH LONG-TERM CURRENT USE OF INSULIN (HCC): ICD-10-CM

## 2021-06-22 LAB — HBA1C MFR BLD: 6 % (ref 4–5.6)

## 2021-08-03 DIAGNOSIS — E53.8 VITAMIN B12 DEFICIENCY: ICD-10-CM

## 2021-08-03 DIAGNOSIS — E11.9 TYPE 2 DIABETES MELLITUS WITHOUT COMPLICATION, WITH LONG-TERM CURRENT USE OF INSULIN (HCC): ICD-10-CM

## 2021-08-03 DIAGNOSIS — R00.0 TACHYCARDIA: ICD-10-CM

## 2021-08-03 DIAGNOSIS — E78.2 MIXED HYPERLIPIDEMIA: ICD-10-CM

## 2021-08-03 DIAGNOSIS — Z79.4 TYPE 2 DIABETES MELLITUS WITHOUT COMPLICATION, WITH LONG-TERM CURRENT USE OF INSULIN (HCC): ICD-10-CM

## 2021-08-03 LAB
BASOPHILS ABSOLUTE: 0.06 E9/L (ref 0–0.2)
BASOPHILS RELATIVE PERCENT: 0.8 % (ref 0–2)
EOSINOPHILS ABSOLUTE: 0.28 E9/L (ref 0.05–0.5)
EOSINOPHILS RELATIVE PERCENT: 3.7 % (ref 0–6)
FOLATE: 12.1 NG/ML (ref 4.8–24.2)
HCT VFR BLD CALC: 39.6 % (ref 34–48)
HEMOGLOBIN: 12 G/DL (ref 11.5–15.5)
IMMATURE GRANULOCYTES #: 0.03 E9/L
IMMATURE GRANULOCYTES %: 0.4 % (ref 0–5)
LYMPHOCYTES ABSOLUTE: 1.3 E9/L (ref 1.5–4)
LYMPHOCYTES RELATIVE PERCENT: 17.3 % (ref 20–42)
MCH RBC QN AUTO: 26.3 PG (ref 26–35)
MCHC RBC AUTO-ENTMCNC: 30.3 % (ref 32–34.5)
MCV RBC AUTO: 86.7 FL (ref 80–99.9)
MONOCYTES ABSOLUTE: 0.4 E9/L (ref 0.1–0.95)
MONOCYTES RELATIVE PERCENT: 5.3 % (ref 2–12)
NEUTROPHILS ABSOLUTE: 5.45 E9/L (ref 1.8–7.3)
NEUTROPHILS RELATIVE PERCENT: 72.5 % (ref 43–80)
PDW BLD-RTO: 15.2 FL (ref 11.5–15)
PLATELET # BLD: 218 E9/L (ref 130–450)
PMV BLD AUTO: 10.8 FL (ref 7–12)
RBC # BLD: 4.57 E12/L (ref 3.5–5.5)
SEDIMENTATION RATE, ERYTHROCYTE: 32 MM/HR (ref 0–20)
TSH SERPL DL<=0.05 MIU/L-ACNC: 1.22 UIU/ML (ref 0.27–4.2)
VITAMIN B-12: 572 PG/ML (ref 211–946)
WBC # BLD: 7.5 E9/L (ref 4.5–11.5)

## 2021-08-04 LAB
ALBUMIN SERPL-MCNC: 4.3 G/DL (ref 3.5–5.2)
ALP BLD-CCNC: 61 U/L (ref 35–104)
ALT SERPL-CCNC: 17 U/L (ref 0–32)
ANION GAP SERPL CALCULATED.3IONS-SCNC: 14 MMOL/L (ref 7–16)
AST SERPL-CCNC: 21 U/L (ref 0–31)
BILIRUB SERPL-MCNC: 0.3 MG/DL (ref 0–1.2)
BUN BLDV-MCNC: 15 MG/DL (ref 6–23)
CALCIUM SERPL-MCNC: 9.7 MG/DL (ref 8.6–10.2)
CHLORIDE BLD-SCNC: 100 MMOL/L (ref 98–107)
CHOLESTEROL, TOTAL: 178 MG/DL (ref 0–199)
CO2: 23 MMOL/L (ref 22–29)
CREAT SERPL-MCNC: 0.8 MG/DL (ref 0.5–1)
GFR AFRICAN AMERICAN: >60
GFR NON-AFRICAN AMERICAN: >60 ML/MIN/1.73
GLUCOSE BLD-MCNC: 99 MG/DL (ref 74–99)
HDLC SERPL-MCNC: 39 MG/DL
LDL CHOLESTEROL CALCULATED: 93 MG/DL (ref 0–99)
MAGNESIUM: 1.5 MG/DL (ref 1.6–2.6)
PHOSPHORUS: 3.7 MG/DL (ref 2.5–4.5)
POTASSIUM SERPL-SCNC: 4.4 MMOL/L (ref 3.5–5)
SODIUM BLD-SCNC: 137 MMOL/L (ref 132–146)
TOTAL PROTEIN: 7.6 G/DL (ref 6.4–8.3)
TRIGL SERPL-MCNC: 231 MG/DL (ref 0–149)
VITAMIN D 25-HYDROXY: 35 NG/ML (ref 30–100)
VLDLC SERPL CALC-MCNC: 46 MG/DL

## 2021-11-03 ENCOUNTER — TELEPHONE (OUTPATIENT)
Dept: PHARMACY | Facility: CLINIC | Age: 61
End: 2021-11-03

## 2021-11-03 NOTE — TELEPHONE ENCOUNTER
According to our records, patient is missing the following requirements that must be completed by December 31st, 2021:   Program Requirements that need to be completed by December 31st, 2021 to remain eligible for program:       Second A1C in 2021     Left message for patient on voicemail advising of the above information. Left our phone number: [phone] Option #3 if she has any questions/concerns. [employer] message sent.       Alexander Hussein, [facility]   Phone: [phone]

## 2021-11-09 DIAGNOSIS — E11.9 TYPE 2 DIABETES MELLITUS WITHOUT COMPLICATION, WITH LONG-TERM CURRENT USE OF INSULIN (HCC): ICD-10-CM

## 2021-11-09 DIAGNOSIS — Z79.4 TYPE 2 DIABETES MELLITUS WITHOUT COMPLICATION, WITH LONG-TERM CURRENT USE OF INSULIN (HCC): ICD-10-CM

## 2021-11-09 LAB — HBA1C MFR BLD: 5.7 % (ref 4–5.6)

## 2021-11-19 NOTE — TELEPHONE ENCOUNTER
For [place] in place:  No   Recommendation Provided To: Patient/Caregiver: 1 via Telephone   Gap Closed?: Yes    Intervention Accepted By: Patient/Caregiver: 1   Time Spent (min): 15

## 2021-12-08 ENCOUNTER — TELEPHONE (OUTPATIENT)
Dept: ORTHOPEDIC SURGERY | Age: 61
End: 2021-12-08

## 2021-12-08 DIAGNOSIS — M65.312 TRIGGER THUMB OF LEFT HAND: Primary | ICD-10-CM

## 2021-12-09 ENCOUNTER — OFFICE VISIT (OUTPATIENT)
Dept: ORTHOPEDIC SURGERY | Age: 61
End: 2021-12-09
Payer: COMMERCIAL

## 2021-12-09 VITALS — WEIGHT: 200 LBS | RESPIRATION RATE: 22 BRPM | HEIGHT: 65 IN | BODY MASS INDEX: 33.32 KG/M2

## 2021-12-09 DIAGNOSIS — M65.312 TRIGGER THUMB OF LEFT HAND: Primary | ICD-10-CM

## 2021-12-09 PROCEDURE — 99214 OFFICE O/P EST MOD 30 MIN: CPT | Performed by: ORTHOPAEDIC SURGERY

## 2021-12-09 PROCEDURE — 20550 NJX 1 TENDON SHEATH/LIGAMENT: CPT | Performed by: ORTHOPAEDIC SURGERY

## 2021-12-09 RX ORDER — BETAMETHASONE SODIUM PHOSPHATE AND BETAMETHASONE ACETATE 3; 3 MG/ML; MG/ML
6 INJECTION, SUSPENSION INTRA-ARTICULAR; INTRALESIONAL; INTRAMUSCULAR; SOFT TISSUE ONCE
Status: COMPLETED | OUTPATIENT
Start: 2021-12-09 | End: 2021-12-09

## 2021-12-09 RX ADMIN — BETAMETHASONE SODIUM PHOSPHATE AND BETAMETHASONE ACETATE 6 MG: 3; 3 INJECTION, SUSPENSION INTRA-ARTICULAR; INTRALESIONAL; INTRAMUSCULAR; SOFT TISSUE at 16:15

## 2021-12-09 NOTE — PROGRESS NOTES
Chief Complaint   Patient presents with    Trigger finger     left thumb trigger finger          Cait Fernandez is a 57y.o. year old  who presents with new onset left thumb triggering. She reports for the past 6 months has had painful triggering of the thumb. Patient reports nocturnal locking in a flexed position. Painful clicking. Symptoms been refractory to bracing and activity modifications. No other treatments have been provided. She reports her carpal tunnel symptoms remain resolved status post carpal tunnel release. Past Medical History:   Diagnosis Date    A-fib [facility])     Dr. Tess Norman Atrial fibrillation [facility])     CAD (coronary artery disease)     Dr. Izabella Muller [facility]) 2006    left breast - removed surgically     Heartburn     Lymphedema 2007    Left arm following breast cancer    Mixed hyperlipidemia 2/23/2017    Type 2 diabetes mellitus without complication ([facility] [address]      Past Surgical History:   Procedure Laterality Date    ABLATION OF DYSRHYTHMIC FOCUS      tachycardia  had ablation x 4, most recent 06/2006    BREAST SURGERY  2007    left breast ca--bilateral mastectomy- no IV/BP left arm     CARDIAC SURGERY      cardiac cath 2006 - no stents     CARPAL TUNNEL RELEASE Left 12/31/2019    LEFT ENDOSCOPIC CARPAL TUNNEL RELEASE LEFT  DEQUERVAINS RELEASE performed by Ector Stanley MD at [facility] CATH LAB PROCEDURE  3/28/06    TMH: Left main: Patent vessel with no significant disease. LAD: Less than 20% ostial stenosis. Rest of LAD no significant disease, long, wrapped around apex. LCX: Patent with no disease. RCA: Patent with no significant disease.  Normal LVF, EF more than 55%, no MR.    HERNIA REPAIR      HYSTERECTOMY  feb 2013    laparoscopic robotic assisted    MASTECTOMY Bilateral 2007       Current Outpatient Medications:     sertraline (ZOLOFT) 50 MG tablet, TAKE 1 TABLET BY MOUTH ONE TIME A DAY, Disp: 90 tablet, Rfl: 1   needed for symptoms of irregular blood glucose. AgaMatrix Glucose Meter, Disp: 300 strip, Rfl: 5    fluticasone (VERAMYST) 27.5 MCG/SPRAY nasal spray, 1 spray by Nasal route 2 times daily, Disp: 3 Bottle, Rfl: 3    aspirin 81 MG chewable tablet, Take 81 mg by mouth daily To check with Dr. Maryann Barksdale office, Disp: , Rfl:     celecoxib (CELEBREX) 100 MG capsule, Take 1 capsule by mouth 2 times daily, Disp: 60 capsule, Rfl: 2    colchicine (COLCRYS) 0.6 MG tablet, Take two tablets daily for two days then once daily for one week., Disp: 11 tablet, Rfl: 2    Insulin Pen Needle (BD PEN NEEDLE FLOYD U/F) 32G X 4 MM MISC, USE AS DIRECTED, Disp: 100 each, Rfl: 5    albuterol sulfate HFA (PROAIR HFA) 108 (90 Base) MCG/ACT inhaler, Inhale 2 puffs into the lungs every 4 hours as needed for Wheezing or Shortness of Breath (Patient taking differently: Inhale 2 puffs into the lungs every 4 hours as needed for Wheezing or Shortness of Breath Instructed to take am of procedure and bring), Disp: 2 Inhaler, Rfl: 3    pyridoxine (RA VITAMIN B-6) 50 MG tablet, Take 2 tablets by mouth daily (Patient taking differently: Take 100 mg by mouth daily LD 12-26-19), Disp: 90 tablet, Rfl: 3  No Known Allergies  Social History     Socioeconomic History    Marital status:      Spouse name: Masoud Pascual Number of children: 1    Years of education: 12    Highest education level: Not on file   Occupational History    Occupation:      Employer: [employer]   Tobacco Use    Smoking status: Former Smoker     Packs/day: 1.00     Types: Cigarettes     Start date: 7/26/1977     Quit date: 6/24/2006     Years since quitting: 15.4    Smokeless tobacco: Never Used    Tobacco comment: quit 2006   Vaping Use    Vaping Use: Never used   Substance and Sexual Activity    Alcohol use: Yes     Comment: Occasionally.  1 cup of coffee a day     Drug use: No    Sexual activity: Yes     Partners: Male   Other Topics Concern    Not on file Social History Narrative    Not on file     Social Determinants of Health     Financial Resource Strain:     Difficulty of Paying Living Expenses: Not on file   Food Insecurity:     Worried About Running Out of Food in the Last Year: Not on file    Wesley of Food in the Last Year: Not on file   Transportation Needs:     Lack of Transportation (Medical): Not on file    Lack of Transportation (Non-Medical): Not on file   Physical Activity:     Days of Exercise per Week: Not on file    Minutes of Exercise per Session: Not on file   Stress:     Feeling of Stress : Not on file   Social Connections:     Frequency of Communication with Friends and Family: Not on file    Frequency of Social Gatherings with Friends and Family: Not on file    Attends Muslim Services: Not on file    Active Member of [address] [place] [address] [employer] or Organizations: Not on file    Attends Club or Organization Meetings: Not on file    Marital Status: Not on file   Intimate Partner Violence:     Fear of Current or Ex-Partner: Not on file    Emotionally Abused: Not on file    Physically Abused: Not on file    Sexually Abused: Not on file   Housing Stability:     Unable to Pay for Housing in the Last Year: Not on file    Number of [place] in the Last Year: Not on file    Unstable Housing in the Last Year: Not on file     Family History   Problem Relation Age of Onset    Heart Failure Mother          at 66    Heart Disease Father     Cancer Father         Pancreatic Cancer  at 76    Diabetes Sister         MRSA pneumonia    Other Brother         Valvular heart disease    No Known Problems Brother     Diabetes Brother        Skin: (-) rash,(-) psoriasis,(-) eczema, (-)skin cancer. Musculoskeletal: Left thumb triggering  Neurologic: (-) epilepsy, (-)seizures,(-) brain tumor,(-) TIA, (-)stroke, (-)headaches, (-)Parkinson disease,(-) memory loss, (-) LOC.   Cardiovascular: (-) Chest pain, (-) swelling in legs/feet, (-) SOB, (-) cramping in legs/feet with walking. SUBJECTIVE:      Constitutional:    The patient is alert and oriented x 3, appears to be stated age and in no distress. Left upper extremity: Nontender about shoulder and elbow. Negative CMC grind test.  Negative Finkelstein's. Tenderness over the A1 pulley of the thumb. Well-healed carpal tunnel release scar. Negative Tinel's of the carpal tunnel. Negative atrophy. APB and intrinsic strength 5/5. Pretrigger nodules the index and middle fingers without active triggering. Full index middle ring and small finger flexion extension. Limited flexion extension of the thumb IP joint secondary to pain or discomfort. 2+ radial pulse. Motor testing 5/5 grossly to the upper extremity otherwise neurovascular intact. Xrays: X-rays of the left hand obtained today office AP lateral obliques were negative for acute fracture dislocations. Joint spaces well-preserved. Impression office x-rays: Negative for acute fracture dislocations  Radiographic findings reviewed with patient      Impression:   Encounter Diagnosis   Name Primary?  Trigger thumb of left hand Yes   A-ftib, CAD  Diabetes    Plan: Natural history and expected course discussed. Questions answered. Treatment options explained and discussed. Patient like to have a cortisone injection was provided under sterile conditions today in the office. In addition she was fitted with a stack splint to wear at night to prevent IP joint flexion. She may follow-up every 3 months for cortisone injections. May follow-up in 6 weeks if symptoms persist.  Discussed possible surgical intervention in the form of trigger release if needed. Procedure Note Trigger Finger Injection    The left Thumb A1 pulley was identified as the injection site. The risk and benefits of a cortisone injection were explained and the patient consented to the injection.  Under sterile conditions, the digit was injected with a mixture of 1 mL of 1% Lidocaine and 1 mL of 6 mg/mL Betamethasone without complication. A sterile bandage was applied.

## 2022-01-24 ENCOUNTER — TELEPHONE (OUTPATIENT)
Dept: PHARMACY | Facility: CLINIC | Age: 62
End: 2022-01-24

## 2022-01-24 NOTE — TELEPHONE ENCOUNTER
Pharmacy Pop Care Documentation:   2022 Annual Pharmacy  Visit     Called patient to complete yearly pharmacy appointment to discuss the 2022 Diabetes Management Program.     No answer. Left VM on [phone] TAD: Please call back at [phone] Option #3.      [facility]   Department, toll free: [phone] Option #3

## 2022-01-26 NOTE — TELEPHONE ENCOUNTER
Second attempt made to contact patient to complete 2022 Annual Pharmacy  Visit for the DM Program.  No answer. Left VM on [phone] TAD: Please call back at [phone] Option #3. [employer] message sent to patient.      [facility]   Department, toll free: [phone] Option #3    For Pharmacy Admin Tracking Only     CPA in place:  No   Recommendation Provided To: Patient/Caregiver: 1 via Telephone and Motion Displayst Message   Gap Closed?: No    Intervention Accepted By: Patient/Caregiver: 1   Time Spent (min): 10

## 2022-03-04 ENCOUNTER — TELEPHONE (OUTPATIENT)
Dept: PHARMACY | Facility: CLINIC | Age: 62
End: 2022-03-04

## 2022-03-04 NOTE — TELEPHONE ENCOUNTER
Pharmacy Pop Care Documentation:   2022 Annual Pharmacy  Visit     Called patient to complete yearly pharmacy appointment to discuss the 2022 Diabetes Management Program.     No answer. Left VM. Please call back at [phone] Option #3.       [address]  [facility]   Phone: toll free [phone], option 3

## 2022-03-04 NOTE — TELEPHONE ENCOUNTER
Incoming call received from the patient. Scheduled PharmD visit on 3/10/22 at 11 am. Will sign off.      Jeni Cooper PharmD, [facility] // Department, toll free [phone], option 1      For Stuart Pandya in place:  No   Recommendation Provided To: Patient/Caregiver: 1 via Telephone   Intervention Detail: Scheduled Appointment   Gap Closed?: Yes    Intervention Accepted By: Provider: 1   Time Spent (min): 15

## 2022-03-04 NOTE — TELEPHONE ENCOUNTER
Will call patient at scheduled time to complete CSS appointment.        Adam Fu, [address] [facility]   Phone: [phone]

## 2022-03-10 ENCOUNTER — PATIENT MESSAGE (OUTPATIENT)
Dept: PHARMACY | Facility: CLINIC | Age: 62
End: 2022-03-10

## 2022-03-10 NOTE — LETTER
[address]  [address], [address]  Phone: toll free [phone] Option #3        [phone]           03/24/22     Dear Mario Gonzalez are currently enrolled in the [facility] Diabetes Program - Be Well With Diabetes. After your recent 2022 Annual Pharmacy  Visit, the below were identified as opportunities to assist you with your diabetes management:       Current medications eligible for copay waiver, up to $600, through [id number]:   - Aspirin, Enalapril, Hydrochlorothiazide, Insulin, Victoza, Metformin, Simvastatin   - Generic ([facility] pharmacy-stocked) insulin syringes and pen needles   - Agamatrix or Prodigy meter and supplies     [facility] pharmacy can be contacted at: [phone], option 0 - please allow 2 weeks for processing and shipping prescriptions     Please work with your provider to ensure that the 2022 requirements are completed by the appropriate dates. Program Requirements to be completed in 2022:   1. Two office visits in 2022 for diabetes (1st must be completed by 7/1/2022)   2. Two A1c levels in 2022 (1st must be completed by 7/1/2022)   3. Yearly lipid panel   4. Yearly urine microalbumin test   5. Diabetes education if A1c is over 8%   6. Taking an ACE/ARB medication if appropriate   7. Taking a statin medication if appropriate   8. Pneumonia vaccine up to date   5. Yearly flu shot (for 5085-7489 season)   10. Medication adherence over 70%. Patients who fall below 70% will be contacted by a pharmacist in the program to discuss any issues.      If you have a provider outside of the [facility] system - a provider that does not use the [facility] electronic chart system - or if you have your testing - lab/urine - done at a facility other than [facility] you will have to email/fax this information to us so that we may document it into your [facility] Records. Thank you,   [facility] Team   Phone: toll free [phone], option #3   Fax: [phone]   Email: [email or web address]

## 2022-03-10 NOTE — TELEPHONE ENCOUNTER
[facility] [employer] Diabetes Program - Be Well With Diabetes  =================================================================  Ky Hayden is a 57 y.o. female enrolled in the [address] [place] [address] with patient for yearly visit to discuss enrollment in program. Patient provided writer with verbal consent to remain in the program for this year. Program Requirements to be completed in 2022:  1. Two office visits in 2022 for diabetes (1st must be completed by 7/1/2022)  2. Two A1c levels in 2022 (1st must be completed by 7/1/2022)  3. Yearly lipid panel  4. Yearly urine microalbumin test  5. Diabetes education if A1c is over 8%  6. Taking an ACE/ARB medication if appropriate  7. Taking a statin medication if appropriate  8. Pneumonia vaccine up to date  5. Yearly flu shot (for 7143-2330 season)  10. Medication adherence over 70%. Patients who fall below 70% will be contacted by a pharmacist in the program to discuss any issues. Are you currently using [address] [place] [address] (home delivery pharmacy) to get your prescriptions? Yes    Would you like to speak with a pharmacist about the program, your diabetes, and/or your prescriptions? [facility] Team  Phone: toll free [phone], option #3  Fax [phone]  Email: [email or web address]    For Pharmacy Admin Tracking Only    PHSO: Yes  Recommended intervention potential cost savings: 1  Time Spent (min): 15

## 2022-04-15 ENCOUNTER — OFFICE VISIT (OUTPATIENT)
Dept: ORTHOPEDIC SURGERY | Age: 62
End: 2022-04-15
Payer: COMMERCIAL

## 2022-04-15 VITALS — WEIGHT: 200 LBS | HEIGHT: 65 IN | BODY MASS INDEX: 33.32 KG/M2

## 2022-04-15 DIAGNOSIS — M65.312 TRIGGER THUMB OF LEFT HAND: Primary | ICD-10-CM

## 2022-04-15 PROCEDURE — 99213 OFFICE O/P EST LOW 20 MIN: CPT | Performed by: PHYSICIAN ASSISTANT

## 2022-04-15 PROCEDURE — 20550 NJX 1 TENDON SHEATH/LIGAMENT: CPT | Performed by: PHYSICIAN ASSISTANT

## 2022-04-15 RX ORDER — BETAMETHASONE SODIUM PHOSPHATE AND BETAMETHASONE ACETATE 3; 3 MG/ML; MG/ML
6 INJECTION, SUSPENSION INTRA-ARTICULAR; INTRALESIONAL; INTRAMUSCULAR; SOFT TISSUE ONCE
Status: COMPLETED | OUTPATIENT
Start: 2022-04-15 | End: 2022-04-15

## 2022-04-15 RX ADMIN — BETAMETHASONE SODIUM PHOSPHATE AND BETAMETHASONE ACETATE 6 MG: 3; 3 INJECTION, SUSPENSION INTRA-ARTICULAR; INTRALESIONAL; INTRAMUSCULAR; SOFT TISSUE at 10:54

## 2022-04-15 NOTE — PROGRESS NOTES
Chief Complaint   Patient presents with    Trigger finger     L thumb trigger - requesting injection     Cyst     R middle finger cyst - preventing rom          Ilir Sanchez is a 60y.o. year old  who presents with left thumb triggering. She reports for the last couple of months has had painful triggering of the thumb. Patient reports nocturnal locking in a flexed position. Painful clicking. Symptoms been refractory to bracing and activity modifications. She reports her carpal tunnel symptoms remain resolved status post carpal tunnel release. 4 months ago the patient had a cortisone injection to the left thumb for trigger finger. She states this significantly helped her symptoms. She states the last couple weeks her symptoms have slowly returned. She is requesting a repeat injection at today's visit along with having questions about surgical management. Past Medical History:   Diagnosis Date    A-fib [facility])     Dr. David Covarrubias Atrial fibrillation [facility])     CAD (coronary artery disease)     Dr. Tyler Hadley [facility]) 2006    left breast - removed surgically     Heartburn     Lymphedema 2007    Left arm following breast cancer    Mixed hyperlipidemia 2/23/2017    Type 2 diabetes mellitus without complication ([facility] [address]      Past Surgical History:   Procedure Laterality Date    ABLATION OF DYSRHYTHMIC FOCUS      tachycardia  had ablation x 4, most recent 06/2006   [address]      cardiac cath 2006 - no stents     CARPAL TUNNEL RELEASE Left 12/31/2019    LEFT ENDOSCOPIC CARPAL TUNNEL RELEASE LEFT  DEQUERVAINS RELEASE performed by Dorenda Bumpers, MD at [facility] CATH LAB PROCEDURE  03/28/2006    TMH: Left main: Patent vessel with no significant disease. LAD: Less than 20% ostial stenosis. Rest of LAD no significant disease, long, wrapped around apex. LCX: Patent with no disease. RCA: Patent with no significant disease.  Normal LVF, EF more than 55%, no MR.    HERNIA REPAIR      HYSTERECTOMY  02/2013    laparoscopic robotic assisted    MASTECTOMY Bilateral 2007    left breast ca--bilateral mastectomy- no IV/BP left arm       Current Outpatient Medications:     gabapentin (NEURONTIN) 300 MG capsule, Take 1 capsule by mouth 2 times daily for 180 days.  Intended supply: 90 days, Disp: 180 capsule, Rfl: 1    allopurinol (ZYLOPRIM) 100 MG tablet, Take 1 tablet by mouth daily, Disp: 90 tablet, Rfl: 1    metFORMIN (GLUCOPHAGE) 1000 MG tablet, TAKE 1 TABLET BY MOUTH 2 TIMES A DAY WITH MEALS, Disp: 180 tablet, Rfl: 1    hydroCHLOROthiazide (HYDRODIURIL) 25 MG tablet, TAKE 1 TABLET BY MOUTH ONE TIME A DAY, Disp: 90 tablet, Rfl: 1    sertraline (ZOLOFT) 50 MG tablet, Take 1 tablet by mouth daily, Disp: 90 tablet, Rfl: 1    atenolol (TENORMIN) 25 MG tablet, 25 mg TWICE daily, Disp: 180 tablet, Rfl: 3    flecainide (TAMBOCOR) 100 MG tablet, Take 1 tablet by mouth 2 times daily, Disp: 180 tablet, Rfl: 2    Liraglutide (VICTOZA) 18 MG/3ML SOPN SC injection, Inject 1.8 mg into the skin daily, Disp: 27 mL, Rfl: 5    enalapril (VASOTEC) 2.5 MG tablet, Take 1 tablet by mouth nightly, Disp: 90 tablet, Rfl: 3    simvastatin (ZOCOR) 40 MG tablet, Take 1 tablet by mouth nightly, Disp: 90 tablet, Rfl: 1    lansoprazole (PREVACID) 30 MG delayed release capsule, Take 1 capsule by mouth daily, Disp: 90 capsule, Rfl: 1    cyanocobalamin 1000 MCG/ML injection, Inject 1 mL into the muscle every 30 days injfect 1cc weekly for 30 days followed by once monthly thereafter, Disp: 3 vial, Rfl: 3    vitamin D (ERGOCALCIFEROL) 1.25 MG (06734 UT) CAPS capsule, TAKE 1 CAPSULE BY MOUTH ONE TIME WEEKLY, Disp: 12 capsule, Rfl: 2    Fluticasone furoate-vilanterol (BREO ELLIPTA) 200-25 MCG/INH AEPB inhaler, One puff daily inhalation- dispense 3(3 months supply), Disp: 3 each, Rfl: 1    azelastine (ASTELIN) 0.1 % nasal spray, USE 2 SPRAYS IN EACH NOSTRIL TWO TIMES A DAY AS DIRECTED, Disp: 1 Bottle, Rfl: 0    Lancets MISC, 1 each by Does not apply route 3 times daily AgaMatrix Glucose Meters, Disp: 300 each, Rfl: 5    blood glucose monitor strips, Test 3 times a day & as needed for symptoms of irregular blood glucose.  AgaMatrix Glucose Meter, Disp: 300 strip, Rfl: 5    fluticasone (VERAMYST) 27.5 MCG/SPRAY nasal spray, 1 spray by Nasal route 2 times daily, Disp: 3 Bottle, Rfl: 3    aspirin 81 MG chewable tablet, Take 81 mg by mouth daily To check with Dr. Katy Marroquin office, Disp: , Rfl:     celecoxib (CELEBREX) 100 MG capsule, Take 1 capsule by mouth 2 times daily, Disp: 60 capsule, Rfl: 2    colchicine (COLCRYS) 0.6 MG tablet, Take two tablets daily for two days then once daily for one week., Disp: 11 tablet, Rfl: 2    Insulin Pen Needle (BD PEN NEEDLE FLOYD U/F) 32G X 4 MM MISC, USE AS DIRECTED, Disp: 100 each, Rfl: 5    albuterol sulfate HFA (PROAIR HFA) 108 (90 Base) MCG/ACT inhaler, Inhale 2 puffs into the lungs every 4 hours as needed for Wheezing or Shortness of Breath (Patient taking differently: Inhale 2 puffs into the lungs every 4 hours as needed for Wheezing or Shortness of Breath Instructed to take am of procedure and bring), Disp: 2 Inhaler, Rfl: 3    insulin glargine (LANTUS SOLOSTAR) 100 UNIT/ML injection pen, Inject 20 Units into the skin nightly, Disp: 15 mL, Rfl: 2    pyridoxine (RA VITAMIN B-6) 50 MG tablet, Take 2 tablets by mouth daily (Patient taking differently: Take 100 mg by mouth daily LD 12-26-19), Disp: 90 tablet, Rfl: 3  No Known Allergies  Social History     Socioeconomic History    Marital status:      Spouse name: Rossi Miller Number of children: 1    Years of education: 12    Highest education level: Not on file   Occupational History    Occupation:      Employer: [employer]   Tobacco Use    Smoking status: Former Smoker     Packs/day: 1.00     Types: Cigarettes     Start date: 7/26/1977     Quit date: 6/24/2006     Years since quitting: 15.8  Smokeless tobacco: Never Used    Tobacco comment: quit 2006   Vaping Use    Vaping Use: Never used   Substance and Sexual Activity    Alcohol use: Yes     Comment: Occasionally. 1 cup of coffee a day     Drug use: No    Sexual activity: Yes     Partners: Male   Other Topics Concern    Not on file   Social History Narrative    Not on file     Social Determinants of Health     Financial Resource Strain:     Difficulty of Paying Living Expenses: Not on file   Food Insecurity:     Worried About Running Out of Food in the Last Year: Not on file    Wesley of Food in the Last Year: Not on file   Transportation Needs:     Lack of Transportation (Medical): Not on file    Lack of Transportation (Non-Medical):  Not on file   Physical Activity:     Days of Exercise per Week: Not on file    Minutes of Exercise per Session: Not on file   Stress:     Feeling of Stress : Not on file   Social Connections:     Frequency of Communication with Friends and Family: Not on file    Frequency of Social Gatherings with Friends and Family: Not on file    Attends Pentecostalism Services: Not on file    Active Member of [address] [place] [address] [employer] or Organizations: Not on file    Attends Club or Organization Meetings: Not on file    Marital Status: Not on file   Intimate Partner Violence:     Fear of Current or Ex-Partner: Not on file    Emotionally Abused: Not on file    Physically Abused: Not on file    Sexually Abused: Not on file   Housing Stability:     Unable to Pay for Housing in the Last Year: Not on file    Number of [place] in the Last Year: Not on file    Unstable Housing in the Last Year: Not on file     Family History   Problem Relation Age of Onset    Heart Failure Mother          at 66    Heart Disease Father     Cancer Father         Pancreatic Cancer  at 76    Diabetes Sister         MRSA pneumonia    Other Brother         Valvular heart disease    No Known Problems Brother     Diabetes Brother Skin: (-) rash,(-) psoriasis,(-) eczema, (-)skin cancer. Musculoskeletal: Left thumb triggering  Neurologic: (-) epilepsy, (-)seizures,(-) brain tumor,(-) TIA, (-)stroke, (-)headaches, (-)Parkinson disease,(-) memory loss, (-) LOC. Cardiovascular: (-) Chest pain, (-) swelling in legs/feet, (-) SOB, (-) cramping in legs/feet with walking. SUBJECTIVE:      Constitutional:    The patient is alert and oriented x 3, appears to be stated age and in no distress. Left upper extremity: Nontender about shoulder and elbow. Achiness with CMC grind test.  Negative Finkelstein's. Tenderness over the A1 pulley of the thumb with no active triggering. Well-healed carpal tunnel release scar. Negative Tinel's of the carpal tunnel. Negative atrophy. APB and intrinsic strength 5/5. Full index middle ring and small finger flexion extension. Limited flexion extension of the thumb IP joint secondary to pain or discomfort. 2+ radial pulse. Motor testing 5/5 grossly to the upper extremity otherwise neurovascular intact. Impression:   Left thumb trigger  S/p left endoscopic carpal tunnel release and left DeQuervains release  A-ftib, CAD  Diabetes    Plan:   Findings explained to the patient. Discussed conservative and surgical management with the patient. Patient would like to try one more cortisone injection to the left thumb trigger at today's visit. If her triggering returns she will follow-up with Dr. Liyah Garcia to discuss surgical management. This was explained to the patient in detail if necessary. Patient may repeat injections every 3 months as needed if they continue to help. All questions answered    Procedure Note Trigger Finger Injection    The left Thumb A1 pulley was identified as the injection site. The risk and benefits of a cortisone injection were explained and the patient consented to the injection.  Under sterile conditions, the digit was injected with a mixture of 1 mL of 1% Lidocaine and 1 mL of 6 mg/mL Betamethasone without complication. A sterile bandage was applied.

## 2022-05-03 ENCOUNTER — PATIENT MESSAGE (OUTPATIENT)
Dept: PHARMACY | Facility: CLINIC | Age: 62
End: 2022-05-03

## 2022-05-03 NOTE — LETTER
Lin [address]  [address], [address]  Phone: toll free [phone] Option #3        [place] [id number]           05/17/22     Dear Rober Patel,    Thanks, so much for taking the first step towards better health.       This message is a friendly reminder of the requirements that are due for the [facility] Be Well With Diabetes Program by July 1st, 2022 to avoid possible discharge from the program:     1st 2022 Provider Visit for DM (1st yearly visit)   1st 2022 A1C     You will have to submit documentation of completion of requirements if your Physician does not use the [facility] electronic charting system or if you have your lab test done outside of [facility]. Return the documentation to [email or web address] or by fax at [phone]     If requirements(s) are not met by July 1st, 2022 you will be disqualified from the program and the credit valued at $600 towards your diabetic medications and supplies will be revoked. You will be able to reapply the following calendar year. Please disregard this message if the above requirements have been completed and documentation has been submitted. Lin [address]   Phone: toll free [phone], option 3   Email: [email or web address]   Fax Number: [phone]

## 2022-05-19 PROBLEM — I47.19 AVNRT (AV NODAL RE-ENTRY TACHYCARDIA): Status: ACTIVE | Noted: 2022-05-19

## 2022-05-19 PROBLEM — I47.20 V-TACH (HCC): Status: ACTIVE | Noted: 2022-05-19

## 2022-05-19 PROBLEM — E78.5 DYSLIPIDEMIA: Status: ACTIVE | Noted: 2022-05-19

## 2022-05-19 PROBLEM — I47.1 AVNRT (AV NODAL RE-ENTRY TACHYCARDIA) (HCC): Status: ACTIVE | Noted: 2022-05-19

## 2022-05-19 PROBLEM — Z87.39 HISTORY OF GOUT: Status: ACTIVE | Noted: 2022-05-19

## 2022-05-19 PROBLEM — E11.42 DIABETIC POLYNEUROPATHY ASSOCIATED WITH TYPE 2 DIABETES MELLITUS (HCC): Status: ACTIVE | Noted: 2022-05-19

## 2022-05-19 PROBLEM — C50.919 MALIGNANT NEOPLASM OF FEMALE BREAST (HCC): Status: ACTIVE | Noted: 2022-05-19

## 2022-05-19 PROBLEM — K21.9 GASTROESOPHAGEAL REFLUX DISEASE: Status: ACTIVE | Noted: 2022-05-19

## 2022-05-19 PROBLEM — J45.30 MILD PERSISTENT ASTHMA WITHOUT COMPLICATION: Status: ACTIVE | Noted: 2022-05-19

## 2022-05-19 PROBLEM — F32.A MILD DEPRESSION: Status: ACTIVE | Noted: 2022-05-19

## 2022-05-31 ENCOUNTER — TELEPHONE (OUTPATIENT)
Dept: PHARMACY | Facility: CLINIC | Age: 62
End: 2022-05-31

## 2022-05-31 NOTE — TELEPHONE ENCOUNTER
POPULATION HEALTH CLINICAL PHARMACY REVIEW - BE WELL WITH DIABETES  =============================================  Federico Lanier is a 60 y.o. female enrolled in the [address] [place] [address],[address] Be Well with Diabetes program.      Incoming call from patient with questions about program requirements. Confirmed that she has completed an OV and A1c and both were coded correctly. She has met our program requirements for the 7/1/22 deadline. Reminded her that she also likely needs to submit her health screening documentation to [employer] to get credit. Advised her to sign in and download the submission form and ask her pcp to fill it out. She verbalized understanding.       Josefa Kayser, PharmD, [facility]  Department, toll free: [phone]      For Pharmacy Admin Tracking Only     Time Spent (min): 5

## 2022-06-16 ENCOUNTER — IMMUNIZATION (OUTPATIENT)
Dept: PRIMARY CARE CLINIC | Age: 62
End: 2022-06-16
Payer: COMMERCIAL

## 2022-06-16 PROCEDURE — 0064A COVID-19, MODERNA BOOSTER, (AGE 18 YRS+), IM, 50MCG/0.25ML: CPT | Performed by: NURSE PRACTITIONER

## 2022-06-16 PROCEDURE — 91306 COVID-19, MODERNA BOOSTER, (AGE 18 YRS+), IM, 50MCG/0.25ML: CPT | Performed by: NURSE PRACTITIONER

## 2023-02-01 ENCOUNTER — TELEPHONE (OUTPATIENT)
Dept: PHARMACY | Facility: CLINIC | Age: 63
End: 2023-02-01

## 2023-02-01 NOTE — TELEPHONE ENCOUNTER
Pharmacy Pop Care Documentation:   2023 Annual Pharmacy  Visit     Called patient to complete yearly pharmacy appointment to discuss the 2023 Diabetes Management Program.     No answer. Left VM. Please call back at [phone] Option #3.       Shela Babinski, [address]  Clinical Pharmacy   Phone: toll free [phone], option 3

## 2023-02-08 NOTE — TELEPHONE ENCOUNTER
Second attempt made to contact patient to complete 2023 yearly pharmacy appointment for Diabetes Management Program.    No answer. Left VM. Sleepy's message sent to patient.         Son Koenig, [address]   Phone: [phone], option #3       For Pharmacy Admin Tracking Only    Program: [address]:  No  Gap Closed?: No   Time Spent (min): 10

## 2023-05-04 DIAGNOSIS — E78.5 DYSLIPIDEMIA: ICD-10-CM

## 2023-05-04 DIAGNOSIS — J45.30 MILD PERSISTENT ASTHMA WITHOUT COMPLICATION: ICD-10-CM

## 2023-05-04 DIAGNOSIS — I10 ESSENTIAL HYPERTENSION: ICD-10-CM

## 2023-05-04 DIAGNOSIS — Z79.4 TYPE 2 DIABETES MELLITUS WITHOUT COMPLICATION, WITH LONG-TERM CURRENT USE OF INSULIN (HCC): ICD-10-CM

## 2023-05-04 DIAGNOSIS — E79.0 ELEVATED URIC ACID IN BLOOD: ICD-10-CM

## 2023-05-04 DIAGNOSIS — I47.1 AVNRT (AV NODAL RE-ENTRY TACHYCARDIA) (HCC): ICD-10-CM

## 2023-05-04 DIAGNOSIS — E11.42 DIABETIC POLYNEUROPATHY ASSOCIATED WITH TYPE 2 DIABETES MELLITUS (HCC): ICD-10-CM

## 2023-05-04 DIAGNOSIS — F32.A MILD DEPRESSION: ICD-10-CM

## 2023-05-04 DIAGNOSIS — C50.919 MALIGNANT NEOPLASM OF FEMALE BREAST, UNSPECIFIED ESTROGEN RECEPTOR STATUS, UNSPECIFIED LATERALITY, UNSPECIFIED SITE OF BREAST (HCC): ICD-10-CM

## 2023-05-04 DIAGNOSIS — E11.9 TYPE 2 DIABETES MELLITUS WITHOUT COMPLICATION, WITH LONG-TERM CURRENT USE OF INSULIN (HCC): ICD-10-CM

## 2023-05-04 DIAGNOSIS — K21.9 GASTROESOPHAGEAL REFLUX DISEASE, UNSPECIFIED WHETHER ESOPHAGITIS PRESENT: ICD-10-CM

## 2023-05-04 LAB
ALBUMIN SERPL-MCNC: 4.4 G/DL (ref 3.5–5.2)
ALP SERPL-CCNC: 73 U/L (ref 35–104)
ALT SERPL-CCNC: 16 U/L (ref 0–32)
ANION GAP SERPL CALCULATED.3IONS-SCNC: 15 MMOL/L (ref 7–16)
AST SERPL-CCNC: 20 U/L (ref 0–31)
BASOPHILS # BLD: 0.04 E9/L (ref 0–0.2)
BASOPHILS NFR BLD: 0.8 % (ref 0–2)
BILIRUB SERPL-MCNC: 0.4 MG/DL (ref 0–1.2)
BUN SERPL-MCNC: 16 MG/DL (ref 6–23)
CALCIUM SERPL-MCNC: 9.7 MG/DL (ref 8.6–10.2)
CHLORIDE SERPL-SCNC: 102 MMOL/L (ref 98–107)
CHOLESTEROL, TOTAL: 170 MG/DL (ref 0–199)
CO2 SERPL-SCNC: 24 MMOL/L (ref 22–29)
CREAT SERPL-MCNC: 0.6 MG/DL (ref 0.5–1)
EOSINOPHIL # BLD: 0.28 E9/L (ref 0.05–0.5)
EOSINOPHIL NFR BLD: 5.6 % (ref 0–6)
ERYTHROCYTE [DISTWIDTH] IN BLOOD BY AUTOMATED COUNT: 14.6 FL (ref 11.5–15)
FOLATE SERPL-MCNC: 9.9 NG/ML (ref 4.8–24.2)
GLUCOSE SERPL-MCNC: 114 MG/DL (ref 74–99)
HBA1C MFR BLD: 5.9 % (ref 4–5.6)
HCT VFR BLD AUTO: 38.9 % (ref 34–48)
HDLC SERPL-MCNC: 47 MG/DL
HGB BLD-MCNC: 11.9 G/DL (ref 11.5–15.5)
IMM GRANULOCYTES # BLD: 0.02 E9/L
IMM GRANULOCYTES NFR BLD: 0.4 % (ref 0–5)
LDLC SERPL CALC-MCNC: 90 MG/DL (ref 0–99)
LYMPHOCYTES # BLD: 1.19 E9/L (ref 1.5–4)
LYMPHOCYTES NFR BLD: 23.7 % (ref 20–42)
MCH RBC QN AUTO: 26.2 PG (ref 26–35)
MCHC RBC AUTO-ENTMCNC: 30.6 % (ref 32–34.5)
MCV RBC AUTO: 85.7 FL (ref 80–99.9)
MONOCYTES # BLD: 0.28 E9/L (ref 0.1–0.95)
MONOCYTES NFR BLD: 5.6 % (ref 2–12)
NEUTROPHILS # BLD: 3.21 E9/L (ref 1.8–7.3)
NEUTS SEG NFR BLD: 63.9 % (ref 43–80)
PLATELET # BLD AUTO: 183 E9/L (ref 130–450)
PMV BLD AUTO: 11.1 FL (ref 7–12)
POTASSIUM SERPL-SCNC: 4.1 MMOL/L (ref 3.5–5)
PROT SERPL-MCNC: 7.4 G/DL (ref 6.4–8.3)
RBC # BLD AUTO: 4.54 E12/L (ref 3.5–5.5)
SODIUM SERPL-SCNC: 141 MMOL/L (ref 132–146)
TRIGL SERPL-MCNC: 164 MG/DL (ref 0–149)
TSH SERPL-MCNC: 1.12 UIU/ML (ref 0.27–4.2)
URATE SERPL-MCNC: 6.1 MG/DL (ref 2.4–5.7)
VIT B12 SERPL-MCNC: 553 PG/ML (ref 211–946)
VLDLC SERPL CALC-MCNC: 33 MG/DL
WBC # BLD: 5 E9/L (ref 4.5–11.5)

## 2023-07-28 ENCOUNTER — OFFICE VISIT (OUTPATIENT)
Dept: FAMILY MEDICINE CLINIC | Age: 63
End: 2023-07-28

## 2023-07-28 VITALS
WEIGHT: 231 LBS | SYSTOLIC BLOOD PRESSURE: 131 MMHG | HEIGHT: 65 IN | DIASTOLIC BLOOD PRESSURE: 75 MMHG | RESPIRATION RATE: 17 BRPM | HEART RATE: 68 BPM | BODY MASS INDEX: 38.49 KG/M2 | TEMPERATURE: 97.1 F | OXYGEN SATURATION: 98 %

## 2023-07-28 DIAGNOSIS — R05.1 ACUTE COUGH: ICD-10-CM

## 2023-07-28 DIAGNOSIS — R39.9 UTI SYMPTOMS: Primary | ICD-10-CM

## 2023-07-28 LAB
BILIRUBIN, POC: NORMAL
BLOOD URINE, POC: NORMAL
CLARITY, POC: CLEAR
COLOR, POC: NORMAL
GLUCOSE URINE, POC: NORMAL
KETONES, POC: NORMAL
LEUKOCYTE EST, POC: NORMAL
NITRITE, POC: POSITIVE
PH, POC: 5
PROTEIN, POC: NORMAL
SPECIFIC GRAVITY, POC: 1.01
UROBILINOGEN, POC: 4

## 2023-07-28 RX ORDER — BENZONATATE 200 MG/1
200 CAPSULE ORAL 3 TIMES DAILY PRN
Qty: 30 CAPSULE | Refills: 0 | Status: SHIPPED | OUTPATIENT
Start: 2023-07-28

## 2023-07-28 RX ORDER — CEFDINIR 300 MG/1
300 CAPSULE ORAL 2 TIMES DAILY
Qty: 14 CAPSULE | Refills: 0 | Status: SHIPPED | OUTPATIENT
Start: 2023-07-28 | End: 2023-08-04

## 2023-07-28 NOTE — PROGRESS NOTES
LEFT  DEQUERVAINS RELEASE performed by Shannan Freitas MD at [facility] CATH LAB PROCEDURE  2006    TMH: Left main: Patent vessel with no significant disease. LAD: Less than 20% ostial stenosis. Rest of LAD no significant disease, long, wrapped around apex. LCX: Patent with no disease. RCA: Patent with no significant disease. Normal LVF, EF more than 55%, no MR.     HERNIA REPAIR      HYSTERECTOMY (CERVIX STATUS UNKNOWN)  2013    laparoscopic robotic assisted    MASTECTOMY Bilateral     left breast ca--bilateral mastectomy- no IV/BP left arm       Family History   Problem Relation Age of Onset    Heart Failure Mother          at 66    Heart Disease Father     Cancer Father         Pancreatic Cancer  at 76    Diabetes Sister         MRSA pneumonia    Other Brother         Valvular heart disease    No Known Problems Brother     Diabetes Brother        Medications:     Current Outpatient Medications:     cefdinir (OMNICEF) 300 MG capsule, Take 1 capsule by mouth 2 times daily for 7 days, Disp: 14 capsule, Rfl: 0    benzonatate (TESSALON) 200 MG capsule, Take 1 capsule by mouth 3 times daily as needed for Cough, Disp: 30 capsule, Rfl: 0    simvastatin (ZOCOR) 40 MG tablet, TAKE ONE TABLET BY MOUTH NIGHTLY, Disp: 90 tablet, Rfl: 1    allopurinol (ZYLOPRIM) 100 MG tablet, TAKE 1 TABLET BY MOUTH ONE TIME A DAY, Disp: 90 tablet, Rfl: 1    fluticasone-umeclidin-vilant (TRELEGY ELLIPTA) 100-62.5-25 MCG/ACT AEPB inhaler, Inhale 1 puff into the lungs daily, Disp: 1 each, Rfl: 3    lansoprazole (PREVACID) 30 MG delayed release capsule, TAKE 1 CAPSULE BY MOUTH ONE TIME A DAY, Disp: 90 capsule, Rfl: 1    LANTUS SOLOSTAR 100 UNIT/ML injection pen, INJECT 20 UNITS INTO THE SKIN NIGHTLY, Disp: 15 mL, Rfl: 1    Semaglutide,0.25 or 0.5MG/DOS, 2 MG/1.5ML SOPN, Inject 0.25 mg once weekly for 4 weeks, then increase to 0.5 mg once weekly, Disp: 3 Adjustable Dose Pre-filled Pen

## 2023-07-30 LAB
CULTURE: ABNORMAL
SPECIMEN DESCRIPTION: ABNORMAL

## 2023-10-16 ENCOUNTER — PATIENT MESSAGE (OUTPATIENT)
Dept: PHARMACY | Facility: CLINIC | Age: 63
End: 2023-10-16

## 2023-10-18 NOTE — TELEPHONE ENCOUNTER
Bethany message not read by patient. Letter mailed.     For Pharmacy Admin Tracking Only    Program: [address] in place:  No  Gap Closed?: No   Time Spent (min): 5

## 2023-11-08 PROBLEM — M10.9 GOUT: Status: ACTIVE | Noted: 2023-11-08

## 2023-11-08 PROBLEM — Z87.39 HISTORY OF GOUT: Status: RESOLVED | Noted: 2022-05-19 | Resolved: 2023-11-08

## 2023-12-13 LAB
ALBUMIN SERPL-MCNC: 4.3 G/DL (ref 3.5–5.2)
ALP SERPL-CCNC: 74 U/L (ref 35–104)
ALT SERPL-CCNC: 15 U/L (ref 0–32)
ANION GAP SERPL CALCULATED.3IONS-SCNC: 14 MMOL/L (ref 7–16)
AST SERPL-CCNC: 17 U/L (ref 0–31)
BASOPHILS # BLD: 0.04 K/UL (ref 0–0.2)
BASOPHILS NFR BLD: 1 % (ref 0–2)
BILIRUB SERPL-MCNC: 0.3 MG/DL (ref 0–1.2)
BUN SERPL-MCNC: 15 MG/DL (ref 6–23)
CALCIUM SERPL-MCNC: 9.6 MG/DL (ref 8.6–10.2)
CHLORIDE SERPL-SCNC: 101 MMOL/L (ref 98–107)
CO2 SERPL-SCNC: 25 MMOL/L (ref 22–29)
CREAT SERPL-MCNC: 0.7 MG/DL (ref 0.5–1)
EOSINOPHIL # BLD: 0.26 K/UL (ref 0.05–0.5)
EOSINOPHILS RELATIVE PERCENT: 4 % (ref 0–6)
ERYTHROCYTE [DISTWIDTH] IN BLOOD BY AUTOMATED COUNT: 14.5 % (ref 11.5–15)
GFR SERPL CREATININE-BSD FRML MDRD: >60 ML/MIN/1.73M2
GLUCOSE SERPL-MCNC: 79 MG/DL (ref 74–99)
HCT VFR BLD AUTO: 37.6 % (ref 34–48)
HGB BLD-MCNC: 11.6 G/DL (ref 11.5–15.5)
IMM GRANULOCYTES # BLD AUTO: <0.03 K/UL (ref 0–0.58)
IMM GRANULOCYTES NFR BLD: 0 % (ref 0–5)
LYMPHOCYTES NFR BLD: 1.61 K/UL (ref 1.5–4)
LYMPHOCYTES RELATIVE PERCENT: 25 % (ref 20–42)
MCH RBC QN AUTO: 26.6 PG (ref 26–35)
MCHC RBC AUTO-ENTMCNC: 30.9 G/DL (ref 32–34.5)
MCV RBC AUTO: 86.2 FL (ref 80–99.9)
MONOCYTES NFR BLD: 0.41 K/UL (ref 0.1–0.95)
MONOCYTES NFR BLD: 6 % (ref 2–12)
NEUTROPHILS NFR BLD: 64 % (ref 43–80)
NEUTS SEG NFR BLD: 4.18 K/UL (ref 1.8–7.3)
PLATELET # BLD AUTO: 195 K/UL (ref 130–450)
PMV BLD AUTO: 11.3 FL (ref 7–12)
POTASSIUM SERPL-SCNC: 4.1 MMOL/L (ref 3.5–5)
PROT SERPL-MCNC: 7.2 G/DL (ref 6.4–8.3)
RBC # BLD AUTO: 4.36 M/UL (ref 3.5–5.5)
SODIUM SERPL-SCNC: 140 MMOL/L (ref 132–146)
WBC OTHER # BLD: 6.5 K/UL (ref 4.5–11.5)

## 2023-12-15 LAB — CANCER AG27-29 SERPL-ACNC: 17 U/ML (ref 0–38)

## 2024-01-12 ENCOUNTER — TELEPHONE (OUTPATIENT)
Dept: PHARMACY | Facility: CLINIC | Age: 64
End: 2024-01-12

## 2024-01-12 NOTE — TELEPHONE ENCOUNTER
**Patient is [facility]**   2024 Annual Pharmacist Visit    Called patient to schedule 2024 yearly pharmacist appointment to discuss medications for Diabetes Management Program.    No answer. Left VM on TAD: Please call back at [phone] Option #3.       [facility]  Clinical Pharmacy   Department, toll free: [phone] Option #3

## 2024-01-19 NOTE — TELEPHONE ENCOUNTER
Second attempt made to contact patient to schedule 2024 yearly pharmacist appointment to discuss medications for Diabetes Management Program.    No answer. Left VM on TAD: Please call back at [phone] Option #3.     [employer] message sent to patient.    [facility]  Clinical Pharmacy   Department, toll free: [phone] Option #3      For Pharmacy Admin Tracking Only    Program: [email or web address]  CPA in place:  No  Gap Closed?: No   Time Spent (min): 5

## 2024-03-01 ENCOUNTER — CLINICAL DOCUMENTATION (OUTPATIENT)
Dept: PHARMACY | Facility: CLINIC | Age: 64
End: 2024-03-01

## 2024-03-01 NOTE — PROGRESS NOTES
Pharmacy Pop Care Documentation:     Katie Shipley is being removed from the diabetes management program for the following reason(s):  Per HR census and [employer] patient no longer has BS benefits: Ended: 12/31/23    Justine Tsang    For Pharmacy Admin Tracking Only    Program: [facility]  CPA in place:  No  Gap Closed?: Yes   Time Spent (min): 5

## 2024-03-06 PROBLEM — E11.42 DIABETIC POLYNEUROPATHY ASSOCIATED WITH TYPE 2 DIABETES MELLITUS (HCC): Status: RESOLVED | Noted: 2022-05-19 | Resolved: 2024-03-06

## 2024-03-06 PROBLEM — E11.9 TYPE 2 DIABETES MELLITUS WITHOUT COMPLICATION, WITH LONG-TERM CURRENT USE OF INSULIN (HCC): Status: RESOLVED | Noted: 2017-02-23 | Resolved: 2024-03-06

## 2024-03-06 PROBLEM — Z79.4 TYPE 2 DIABETES MELLITUS WITHOUT COMPLICATION, WITH LONG-TERM CURRENT USE OF INSULIN (HCC): Status: RESOLVED | Noted: 2017-02-23 | Resolved: 2024-03-06

## 2024-03-21 ENCOUNTER — OFFICE VISIT (OUTPATIENT)
Dept: ENT CLINIC | Age: 64
End: 2024-03-21
Payer: MEDICARE

## 2024-03-21 VITALS
HEART RATE: 75 BPM | DIASTOLIC BLOOD PRESSURE: 68 MMHG | BODY MASS INDEX: 36.64 KG/M2 | HEIGHT: 65 IN | WEIGHT: 219.9 LBS | SYSTOLIC BLOOD PRESSURE: 123 MMHG

## 2024-03-21 DIAGNOSIS — R09.82 POST-NASAL DRAINAGE: ICD-10-CM

## 2024-03-21 DIAGNOSIS — K21.9 GASTROESOPHAGEAL REFLUX DISEASE, UNSPECIFIED WHETHER ESOPHAGITIS PRESENT: ICD-10-CM

## 2024-03-21 DIAGNOSIS — J30.9 ALLERGIC RHINITIS, UNSPECIFIED SEASONALITY, UNSPECIFIED TRIGGER: Primary | ICD-10-CM

## 2024-03-21 DIAGNOSIS — R05.9 COUGH, UNSPECIFIED TYPE: ICD-10-CM

## 2024-03-21 PROCEDURE — G8484 FLU IMMUNIZE NO ADMIN: HCPCS | Performed by: NURSE PRACTITIONER

## 2024-03-21 PROCEDURE — 1036F TOBACCO NON-USER: CPT | Performed by: NURSE PRACTITIONER

## 2024-03-21 PROCEDURE — G8417 CALC BMI ABV UP PARAM F/U: HCPCS | Performed by: NURSE PRACTITIONER

## 2024-03-21 PROCEDURE — G8427 DOCREV CUR MEDS BY ELIG CLIN: HCPCS | Performed by: NURSE PRACTITIONER

## 2024-03-21 PROCEDURE — 99203 OFFICE O/P NEW LOW 30 MIN: CPT | Performed by: NURSE PRACTITIONER

## 2024-03-21 PROCEDURE — 3017F COLORECTAL CA SCREEN DOC REV: CPT | Performed by: NURSE PRACTITIONER

## 2024-03-21 RX ORDER — CETIRIZINE HYDROCHLORIDE 10 MG/1
10 TABLET ORAL DAILY
Qty: 30 TABLET | Refills: 1 | Status: SHIPPED | OUTPATIENT
Start: 2024-03-21 | End: 2024-05-20

## 2024-03-21 ASSESSMENT — ENCOUNTER SYMPTOMS
RHINORRHEA: 1
VOICE CHANGE: 1
SHORTNESS OF BREATH: 0
SINUS PAIN: 0
STRIDOR: 0
SORE THROAT: 1
SINUS PRESSURE: 0
COUGH: 1
EYES NEGATIVE: 1
TROUBLE SWALLOWING: 0

## 2024-03-21 NOTE — PROGRESS NOTES
[facility]  Dr. Chuck Ramachandran D.O. Ms.Ed.  New Consult       Patient Name:  Katie Shipley  :  1960     CHIEF C/O:    Chief Complaint   Patient presents with    New Patient     NP chronic sinusitis patient states that PND is severe and coughs constantly patient has hx of nasal polyps patient states that symptoms are increasing and daily        HISTORY OBTAINED FROM:  patient    HISTORY OF PRESENT ILLNESS:       Katie is a 63 y.o. year old female, here today for:       Chronic postnasal drainage and rhinorrhea, cough.  Patient states her symptoms been present for approximately 6 months and feels like they continue to get worse.  Her PCP did start her on Flonase and Astelin nasal spray which she is using both once daily with minimal relief of her symptoms.  She continues to have persistent rhinorrhea and postnasal drainage causing her to cough.  She also has some hoarseness of voice and sore throat from postnasal drainage.  Patient denies any history of previous sinus imaging or sinus surgeries.  She denies any sinus pain or pressure.  Denies any recent fevers or recent antibiotics.  Patient also has a history of acid reflux and is currently on Prevacid which she is now taking daily.  She denies following a strict GERD diet at this time.           Past Medical History:   Diagnosis Date    Asthma     AVNRT (AV mihir re-entry tachycardia)     Breast cancer (HCC)     Diabetic neuropathy (HCC)     Heartburn     Lymphedema 2007    Mixed hyperlipidemia 2017    SVT (supraventricular tachycardia)     Type 2 diabetes mellitus without complication (HCC)     V tach (HCC)      Past Surgical History:   Procedure Laterality Date    ABLATION OF DYSRHYTHMIC FOCUS      tachycardia  had ablation x 4, most recent 2006    CARDIAC SURGERY      cardiac cath 2006 - no stents     CARPAL TUNNEL RELEASE Left 2019    LEFT ENDOSCOPIC CARPAL TUNNEL RELEASE LEFT  DEQUERVAINS RELEASE performed by Deangelo MCKEE

## 2024-05-06 ENCOUNTER — OFFICE VISIT (OUTPATIENT)
Dept: VASCULAR SURGERY | Age: 64
End: 2024-05-06
Payer: MEDICARE

## 2024-05-06 VITALS — BODY MASS INDEX: 33.28 KG/M2 | WEIGHT: 200 LBS

## 2024-05-06 DIAGNOSIS — I72.8 SPLENIC ARTERY ANEURYSM (HCC): Primary | ICD-10-CM

## 2024-05-06 PROCEDURE — 99203 OFFICE O/P NEW LOW 30 MIN: CPT | Performed by: PHYSICIAN ASSISTANT

## 2024-05-06 PROCEDURE — 1123F ACP DISCUSS/DSCN MKR DOCD: CPT | Performed by: PHYSICIAN ASSISTANT

## 2024-05-06 NOTE — PROGRESS NOTES
Start date: 1977     Quit date: 2006     Years since quittin.8     Passive exposure: Past    Smokeless tobacco: Never   Vaping Use    Vaping Use: Never used   Substance and Sexual Activity    Alcohol use: Yes     Comment: Occasionally.    Drug use: Never    Sexual activity: Yes     Partners: Male   Other Topics Concern    Not on file   Social History Narrative    Not on file     Social Determinants of Health     Financial Resource Strain: Low Risk  (2024)    Overall Financial Resource Strain (CARDIA)     Difficulty of Paying Living Expenses: Not hard at all   Food Insecurity: No Food Insecurity (2024)    Hunger Vital Sign     Worried About Running Out of Food in the Last Year: Never true     Ran Out of Food in the Last Year: Never true   Transportation Needs: Unknown (2024)    PRAPARE - Transportation     Lack of Transportation (Medical): Not on file     Lack of Transportation (Non-Medical): No   Physical Activity: Insufficiently Active (4/3/2024)    Exercise Vital Sign     Days of Exercise per Week: 2 days     Minutes of Exercise per Session: 20 min   Stress: No Stress Concern Present (2019)    [employer] [place] [employer] - Occupational Stress Questionnaire     Feeling of Stress : Not at all   Social Connections: Moderately Integrated (2019)    Social Connection and Isolation Panel [NHANES]     Frequency of Communication with Friends and Family: More than three times a week     Frequency of Social Gatherings with Friends and Family: More than three times a week     Attends Rastafari Services: More than 4 times per year     Active Member of Clubs or Organizations: No     Attends Club or Organization Meetings: Never     Marital Status:    Intimate Partner Violence: Not At Risk (2019)    Humiliation, Afraid, Rape, and Kick questionnaire     Fear of Current or Ex-Partner: No     Emotionally Abused: No     Physically Abused: No     Sexually Abused: No

## 2024-07-31 ENCOUNTER — HOSPITAL ENCOUNTER (OUTPATIENT)
Age: 64
Discharge: HOME | End: 2024-08-02
Payer: MEDICARE

## 2024-07-31 DIAGNOSIS — I51.7 CARDIOMEGALY: ICD-10-CM

## 2024-07-31 DIAGNOSIS — R06.09 DYSPNEA ON EXERTION: ICD-10-CM

## 2024-07-31 DIAGNOSIS — I50.32 CHRONIC DIASTOLIC HEART FAILURE (HCC): ICD-10-CM

## 2024-07-31 PROCEDURE — 93306 TTE W/DOPPLER COMPLETE: CPT

## 2024-08-02 LAB
ECHO AV AREA PEAK VELOCITY: 3.2 CM2
ECHO AV AREA VTI: 3.5 CM2
ECHO AV MEAN GRADIENT: 3 MMHG
ECHO AV MEAN VELOCITY: 0.8 M/S
ECHO AV PEAK GRADIENT: 5 MMHG
ECHO AV PEAK VELOCITY: 1.1 M/S
ECHO AV VELOCITY RATIO: 0.82
ECHO AV VTI: 23.1 CM
ECHO LA DIAMETER: 4 CM
ECHO LA VOL A-L A4C: 27 ML (ref 22–52)
ECHO LA VOL MOD A4C: 23 ML (ref 22–52)
ECHO LV EDV A4C: 87 ML
ECHO LV EJECTION FRACTION A4C: 50 %
ECHO LV ESV A4C: 44 ML
ECHO LV FRACTIONAL SHORTENING: 43 % (ref 28–44)
ECHO LV INTERNAL DIMENSION DIASTOLIC: 4.6 CM (ref 3.9–5.3)
ECHO LV INTERNAL DIMENSION SYSTOLIC: 2.6 CM
ECHO LV ISOVOLUMETRIC RELAXATION TIME (IVRT): 148.4 MS
ECHO LV IVSD: 0.9 CM (ref 0.6–0.9)
ECHO LV MASS 2D: 148.1 G (ref 67–162)
ECHO LV POSTERIOR WALL DIASTOLIC: 1 CM (ref 0.6–0.9)
ECHO LV RELATIVE WALL THICKNESS RATIO: 0.43
ECHO LVOT AREA: 4.2 CM2
ECHO LVOT AV VTI INDEX: 0.87
ECHO LVOT DIAM: 2.3 CM
ECHO LVOT MEAN GRADIENT: 2 MMHG
ECHO LVOT PEAK GRADIENT: 3 MMHG
ECHO LVOT PEAK VELOCITY: 0.9 M/S
ECHO LVOT SV: 83.1 ML
ECHO LVOT VTI: 20 CM
ECHO MV A VELOCITY: 0.88 M/S
ECHO MV AREA PHT: 4.2 CM2
ECHO MV AREA VTI: 4.1 CM2
ECHO MV E DECELERATION TIME (DT): 203.1 MS
ECHO MV E VELOCITY: 0.71 M/S
ECHO MV E/A RATIO: 0.81
ECHO MV LVOT VTI INDEX: 1.02
ECHO MV MAX VELOCITY: 1 M/S
ECHO MV MEAN GRADIENT: 1 MMHG
ECHO MV MEAN VELOCITY: 0.6 M/S
ECHO MV PEAK GRADIENT: 4 MMHG
ECHO MV PRESSURE HALF TIME (PHT): 52 MS
ECHO MV VTI: 20.3 CM
ECHO PULMONARY ARTERY END DIASTOLIC PRESSURE: 3 MMHG
ECHO PV MAX VELOCITY: 1.1 M/S
ECHO PV MEAN GRADIENT: 3 MMHG
ECHO PV MEAN VELOCITY: 0.8 M/S
ECHO PV PEAK GRADIENT: 5 MMHG
ECHO PV REGURGITANT MAX VELOCITY: 0.8 M/S
ECHO PV VTI: 22.8 CM
ECHO RV INTERNAL DIMENSION: 3.4 CM
ECHO RV LONGITUDINAL DIMENSION: 7 CM
ECHO RV MID DIMENSION: 3.8 CM
ECHO TV REGURGITANT MAX VELOCITY: 1.91 M/S
ECHO TV REGURGITANT PEAK GRADIENT: 15 MMHG

## 2024-10-01 ENCOUNTER — HOSPITAL ENCOUNTER (OUTPATIENT)
Dept: MAMMOGRAPHY | Age: 64
Discharge: HOME | End: 2024-10-03
Attending: FAMILY MEDICINE
Payer: MEDICARE

## 2024-10-01 DIAGNOSIS — Z78.0 POSTMENOPAUSAL: ICD-10-CM

## 2024-10-01 PROCEDURE — 77080 DXA BONE DENSITY AXIAL: CPT

## (undated) DEVICE — SURGICAL PROCEDURE PACK HND

## (undated) DEVICE — GAUZE 2X2IN ST BORDERED

## (undated) DEVICE — LOW PROFILE (LP) BLADE ASSEMBLY 6PK: Brand: MICROAIRE®

## (undated) DEVICE — SOLUTION IV IRRIG POUR BRL 0.9% SODIUM CHL 2F7124

## (undated) DEVICE — DOUBLE BASIN SET: Brand: MEDLINE INDUSTRIES, INC.

## (undated) DEVICE — STRIP,CLOSURE,WOUND,MEDI-STRIP,1/4X3: Brand: MEDLINE

## (undated) DEVICE — ZIMMER® STERILE DISPOSABLE TOURNIQUET CUFF WITH PLC, DUAL PORT, SINGLE BLADDER, 18 IN. (46 CM)

## (undated) DEVICE — STANDARD (U) BLADE ASSEMBLY 6PK: Brand: MICROAIRE®

## (undated) DEVICE — SUTURE VCRL L48IN 2 0 VLT BRAID CART ABSRB CNPJ482C

## (undated) DEVICE — PADDING UNDERCAST W4INXL4YD COT FBR LO LINTING WYTEX

## (undated) DEVICE — 4-PORT MANIFOLD: Brand: NEPTUNE 2

## (undated) DEVICE — MASTISOL ADHESIVE LIQ 2/3ML

## (undated) DEVICE — INTENDED FOR TISSUE SEPARATION, AND OTHER PROCEDURES THAT REQUIRE A SHARP SURGICAL BLADE TO PUNCTURE OR CUT.: Brand: BARD-PARKER ® STAINLESS STEEL BLADES